# Patient Record
Sex: FEMALE | Race: WHITE | Employment: FULL TIME | URBAN - METROPOLITAN AREA
[De-identification: names, ages, dates, MRNs, and addresses within clinical notes are randomized per-mention and may not be internally consistent; named-entity substitution may affect disease eponyms.]

---

## 2017-01-05 RX ORDER — ATORVASTATIN CALCIUM 20 MG/1
TABLET, FILM COATED ORAL
Qty: 180 TABLET | Refills: 0 | Status: SHIPPED | OUTPATIENT
Start: 2017-01-05 | End: 2017-01-20

## 2017-01-20 RX ORDER — ATORVASTATIN CALCIUM 20 MG/1
TABLET, FILM COATED ORAL
Qty: 180 TABLET | Refills: 0 | Status: SHIPPED | OUTPATIENT
Start: 2017-01-20 | End: 2017-10-11 | Stop reason: DRUGHIGH

## 2017-02-21 RX ORDER — CANAGLIFLOZIN 300 MG/1
TABLET, FILM COATED ORAL
Qty: 30 TABLET | Refills: 5 | Status: SHIPPED | OUTPATIENT
Start: 2017-02-21 | End: 2017-08-14

## 2017-02-28 ENCOUNTER — PATIENT OUTREACH (OUTPATIENT)
Dept: FAMILY MEDICINE CLINIC | Facility: CLINIC | Age: 52
End: 2017-02-28

## 2017-03-21 ENCOUNTER — PATIENT OUTREACH (OUTPATIENT)
Dept: FAMILY MEDICINE CLINIC | Facility: CLINIC | Age: 52
End: 2017-03-21

## 2017-03-21 NOTE — PROGRESS NOTES
LM to call back  # [phone]. CM name and number provided for further f/u. Calling to assist in coordination of health care needs. P= Await call return.

## 2017-04-12 ENCOUNTER — OFFICE VISIT (OUTPATIENT)
Dept: ENDOCRINOLOGY CLINIC | Facility: CLINIC | Age: 52
End: 2017-04-12

## 2017-04-12 VITALS
SYSTOLIC BLOOD PRESSURE: 112 MMHG | HEIGHT: 62 IN | BODY MASS INDEX: 28.86 KG/M2 | WEIGHT: 156.81 LBS | DIASTOLIC BLOOD PRESSURE: 78 MMHG | HEART RATE: 96 BPM

## 2017-04-12 DIAGNOSIS — Z79.4 UNCONTROLLED TYPE 2 DIABETES MELLITUS WITH HYPERGLYCEMIA, WITH LONG-TERM CURRENT USE OF INSULIN (HCC): Primary | ICD-10-CM

## 2017-04-12 DIAGNOSIS — E11.65 UNCONTROLLED TYPE 2 DIABETES MELLITUS WITH HYPERGLYCEMIA, WITH LONG-TERM CURRENT USE OF INSULIN (HCC): Primary | ICD-10-CM

## 2017-04-12 PROCEDURE — 99213 OFFICE O/P EST LOW 20 MIN: CPT | Performed by: INTERNAL MEDICINE

## 2017-04-12 PROCEDURE — 36416 COLLJ CAPILLARY BLOOD SPEC: CPT | Performed by: INTERNAL MEDICINE

## 2017-04-12 PROCEDURE — 82962 GLUCOSE BLOOD TEST: CPT | Performed by: INTERNAL MEDICINE

## 2017-04-12 PROCEDURE — 83036 HEMOGLOBIN GLYCOSYLATED A1C: CPT | Performed by: INTERNAL MEDICINE

## 2017-04-12 NOTE — PROGRESS NOTES
Name: Ubaldo Coley  Date: 4/12/2017    Referring Physician: No ref. provider found    HISTORY OF PRESENT ILLNESS   Ubaldo Coley is a 46year old female who presents for diabetes mellitus.   Since last visit her schedule has changed to nights and getting h EVERY MORNING BEFORE BREAKFAST, Disp: 90 tablet, Rfl: 3  •  MetFORMIN HCl 500 MG Oral Tab, TAKE 2 TABLETS BY MOUTH TWICE DAILY WITH MORNING AND EVENING MEALS, Disp: 270 tablet, Rfl: 3  •  MICROLET LANCETS Does not apply Misc, TEST AS DIRECTED TWICE DAILY, grossly intact  Psychiatric:  oriented to time, self, and place  Nutritional:  no abnormal weight gain or loss    ASSESSMENT/PLAN:      1.  Diabetes Mellitus Type 2, Uncontrolled  -uncontrolled, HgA1c 6.7% -->improved   -She has been relatively stable on ab

## 2017-06-06 RX ORDER — GLIMEPIRIDE 4 MG/1
TABLET ORAL
Qty: 90 TABLET | Refills: 1 | Status: SHIPPED | OUTPATIENT
Start: 2017-06-06 | End: 2017-10-11 | Stop reason: DRUGHIGH

## 2017-07-13 RX ORDER — DULAGLUTIDE 1.5 MG/.5ML
INJECTION, SOLUTION SUBCUTANEOUS
Qty: 2 ML | Refills: 2 | Status: SHIPPED | OUTPATIENT
Start: 2017-07-13 | End: 2017-10-11

## 2017-08-14 RX ORDER — CANAGLIFLOZIN 300 MG/1
TABLET, FILM COATED ORAL
Qty: 30 TABLET | Refills: 0 | Status: SHIPPED | OUTPATIENT
Start: 2017-08-14 | End: 2017-09-22

## 2017-09-18 ENCOUNTER — HOSPITAL ENCOUNTER (OUTPATIENT)
Dept: MAMMOGRAPHY | Age: 52
Discharge: HOME | End: 2017-09-18
Attending: FAMILY MEDICINE
Payer: COMMERCIAL

## 2017-09-18 DIAGNOSIS — E11.9 TYPE 2 DIABETES MELLITUS WITHOUT COMPLICATION, UNSPECIFIED LONG TERM INSULIN USE STATUS: ICD-10-CM

## 2017-09-18 PROCEDURE — 77067 SCR MAMMO BI INCL CAD: CPT | Performed by: FAMILY MEDICINE

## 2017-09-22 RX ORDER — CANAGLIFLOZIN 300 MG/1
TABLET, FILM COATED ORAL
Qty: 30 TABLET | Refills: 0 | Status: SHIPPED | OUTPATIENT
Start: 2017-09-22 | End: 2017-10-11

## 2017-10-02 RX ORDER — ATORVASTATIN CALCIUM 20 MG/1
40 TABLET, FILM COATED ORAL NIGHTLY
Qty: 180 TABLET | Refills: 0 | Status: SHIPPED | OUTPATIENT
Start: 2017-10-02 | End: 2017-10-11

## 2017-10-03 ENCOUNTER — APPOINTMENT (OUTPATIENT)
Dept: LAB | Age: 52
End: 2017-10-03
Attending: INTERNAL MEDICINE
Payer: COMMERCIAL

## 2017-10-03 DIAGNOSIS — Z79.4 UNCONTROLLED TYPE 2 DIABETES MELLITUS WITH HYPERGLYCEMIA, WITH LONG-TERM CURRENT USE OF INSULIN (HCC): ICD-10-CM

## 2017-10-03 DIAGNOSIS — E11.65 UNCONTROLLED TYPE 2 DIABETES MELLITUS WITH HYPERGLYCEMIA, WITH LONG-TERM CURRENT USE OF INSULIN (HCC): ICD-10-CM

## 2017-10-03 PROCEDURE — 84443 ASSAY THYROID STIM HORMONE: CPT

## 2017-10-03 PROCEDURE — 82570 ASSAY OF URINE CREATININE: CPT

## 2017-10-03 PROCEDURE — 80061 LIPID PANEL: CPT

## 2017-10-03 PROCEDURE — 82043 UR ALBUMIN QUANTITATIVE: CPT

## 2017-10-03 PROCEDURE — 36415 COLL VENOUS BLD VENIPUNCTURE: CPT

## 2017-10-03 PROCEDURE — 80053 COMPREHEN METABOLIC PANEL: CPT

## 2017-10-11 ENCOUNTER — OFFICE VISIT (OUTPATIENT)
Dept: ENDOCRINOLOGY CLINIC | Facility: CLINIC | Age: 52
End: 2017-10-11

## 2017-10-11 VITALS
DIASTOLIC BLOOD PRESSURE: 79 MMHG | BODY MASS INDEX: 29.45 KG/M2 | HEIGHT: 61 IN | SYSTOLIC BLOOD PRESSURE: 120 MMHG | HEART RATE: 102 BPM | WEIGHT: 156 LBS

## 2017-10-11 DIAGNOSIS — E11.65 CONTROLLED TYPE 2 DIABETES MELLITUS WITH HYPERGLYCEMIA, WITHOUT LONG-TERM CURRENT USE OF INSULIN (HCC): Primary | ICD-10-CM

## 2017-10-11 PROCEDURE — 82962 GLUCOSE BLOOD TEST: CPT | Performed by: INTERNAL MEDICINE

## 2017-10-11 PROCEDURE — 99213 OFFICE O/P EST LOW 20 MIN: CPT | Performed by: INTERNAL MEDICINE

## 2017-10-11 PROCEDURE — 36416 COLLJ CAPILLARY BLOOD SPEC: CPT | Performed by: INTERNAL MEDICINE

## 2017-10-11 PROCEDURE — 83036 HEMOGLOBIN GLYCOSYLATED A1C: CPT | Performed by: INTERNAL MEDICINE

## 2017-10-11 RX ORDER — GLIMEPIRIDE 4 MG/1
TABLET ORAL
Qty: 90 TABLET | Refills: 3 | Status: SHIPPED | OUTPATIENT
Start: 2017-10-11 | End: 2018-10-15

## 2017-10-11 RX ORDER — ATORVASTATIN CALCIUM 20 MG/1
40 TABLET, FILM COATED ORAL NIGHTLY
Qty: 180 TABLET | Refills: 1 | Status: SHIPPED | OUTPATIENT
Start: 2017-10-11 | End: 2018-07-13

## 2017-10-11 NOTE — PROGRESS NOTES
Name: Sita Duran  Date: 10/11/2017    Referring Physician: No ref. provider found    HISTORY OF PRESENT ILLNESS   Sita Duran is a 46year old female who presents for diabetes mellitus.   Since last visit her schedule has changed to nights and getting CONTOUR NEXT TEST In Vitro Strip, TEST TWICE DAILY, Disp: 100 strip, Rfl: 2  •  ATORVASTATIN CALCIUM 20 MG Oral Tab, TAKE 2 TABLETS BY MOUTH NIGHTLY, Disp: 180 tablet, Rfl: 0  •  glimepiride 4 MG Oral Tab, TAKE 1 TABLET BY MOUTH EVERY MORNING BEFORE BREAKF S4  Gastrointestinal:  normal bowel sounds and no palpable masses in abdomen, organomegaly or tenderness   Musculoskeletal:  normal muscle strength and tone  Skin:  normal moisture and skin texture  Hair & Nails:  normal scalp hair     Neuro:  sensory raz

## 2017-10-17 ENCOUNTER — TELEPHONE (OUTPATIENT)
Dept: INTERNAL MEDICINE CLINIC | Facility: CLINIC | Age: 52
End: 2017-10-17

## 2017-10-17 NOTE — TELEPHONE ENCOUNTER
[facility] ext [phone]    Colonoscopy referral approved by PCP. Please offer appointment for GI consult/colonoscopy  transfer call to GI scheduling at ext [phone] for appointment.

## 2017-10-18 RX ORDER — DULAGLUTIDE 1.5 MG/.5ML
INJECTION, SOLUTION SUBCUTANEOUS
Qty: 2 ML | Refills: 5 | Status: SHIPPED | OUTPATIENT
Start: 2017-10-18 | End: 2018-08-24

## 2017-10-20 RX ORDER — CANAGLIFLOZIN 300 MG/1
TABLET, FILM COATED ORAL
Qty: 30 TABLET | Refills: 5 | Status: SHIPPED | OUTPATIENT
Start: 2017-10-20 | End: 2018-04-16

## 2018-03-22 NOTE — TELEPHONE ENCOUNTER
Current Outpatient Prescriptions:  MetFORMIN HCl 500 MG Oral Tab TAKE 2 TABLETS BY MOUTH TWICE DAILY( WITH MORNING AND EVENING MEALS) Disp: 270 tablet Rfl: 1

## 2018-04-11 ENCOUNTER — OFFICE VISIT (OUTPATIENT)
Dept: ENDOCRINOLOGY CLINIC | Facility: CLINIC | Age: 53
End: 2018-04-11

## 2018-04-11 VITALS
HEIGHT: 61 IN | HEART RATE: 105 BPM | DIASTOLIC BLOOD PRESSURE: 83 MMHG | BODY MASS INDEX: 28.89 KG/M2 | WEIGHT: 153 LBS | SYSTOLIC BLOOD PRESSURE: 126 MMHG

## 2018-04-11 DIAGNOSIS — E11.65 UNCONTROLLED TYPE 2 DIABETES MELLITUS WITH COMPLICATION, WITHOUT LONG-TERM CURRENT USE OF INSULIN (HCC): Primary | ICD-10-CM

## 2018-04-11 DIAGNOSIS — E11.8 UNCONTROLLED TYPE 2 DIABETES MELLITUS WITH COMPLICATION, WITHOUT LONG-TERM CURRENT USE OF INSULIN (HCC): Primary | ICD-10-CM

## 2018-04-11 PROCEDURE — 82962 GLUCOSE BLOOD TEST: CPT | Performed by: INTERNAL MEDICINE

## 2018-04-11 PROCEDURE — 99212 OFFICE O/P EST SF 10 MIN: CPT | Performed by: INTERNAL MEDICINE

## 2018-04-11 PROCEDURE — 36416 COLLJ CAPILLARY BLOOD SPEC: CPT | Performed by: INTERNAL MEDICINE

## 2018-04-11 PROCEDURE — 99213 OFFICE O/P EST LOW 20 MIN: CPT | Performed by: INTERNAL MEDICINE

## 2018-04-11 PROCEDURE — 83036 HEMOGLOBIN GLYCOSYLATED A1C: CPT | Performed by: INTERNAL MEDICINE

## 2018-04-11 RX ORDER — LANCETS
EACH MISCELLANEOUS
Qty: 100 EACH | Refills: 11 | Status: SHIPPED | OUTPATIENT
Start: 2018-04-11 | End: 2019-04-15

## 2018-04-11 NOTE — PROGRESS NOTES
Name: Kana Marie  Date: 4/11/2018    Referring Physician: No ref. provider found    HISTORY OF PRESENT ILLNESS   Kana Marie is a 48year old female who presents for diabetes mellitus.   Since last visit her schedule has changed to nights and getting h total) by mouth nightly., Disp: 180 tablet, Rfl: 1  •  Canagliflozin (INVOKANA) 300 MG Oral Tab, Take 1 tablet by mouth once daily. , Disp: 90 tablet, Rfl: 1  •  Dulaglutide (TRULICITY) 1.5 VF/5.4IY Subcutaneous Solution Pen-injector, Inject 1.5 mg into the murmurs, S3 or S4  Gastrointestinal:  normal bowel sounds and no palpable masses in abdomen, organomegaly or tenderness   Musculoskeletal:  normal muscle strength and tone  Skin:  normal moisture and skin texture  Hair & Nails:  normal scalp hair     Neuro

## 2018-04-16 NOTE — TELEPHONE ENCOUNTER
Current Outpatient Prescriptions:  INVOKANA 300 MG Oral Tab TAKE 1 TABLET BY MOUTH DAILY Disp: 30 tablet Rfl: 5     Refill

## 2018-07-13 RX ORDER — ATORVASTATIN CALCIUM 20 MG/1
40 TABLET, FILM COATED ORAL NIGHTLY
Qty: 180 TABLET | Refills: 1 | Status: SHIPPED | OUTPATIENT
Start: 2018-07-13 | End: 2019-10-13

## 2018-07-18 ENCOUNTER — OFFICE VISIT (OUTPATIENT)
Dept: ENDOCRINOLOGY CLINIC | Facility: CLINIC | Age: 53
End: 2018-07-18

## 2018-07-18 VITALS
SYSTOLIC BLOOD PRESSURE: 107 MMHG | HEIGHT: 61 IN | WEIGHT: 154.19 LBS | HEART RATE: 89 BPM | DIASTOLIC BLOOD PRESSURE: 71 MMHG | BODY MASS INDEX: 29.11 KG/M2

## 2018-07-18 DIAGNOSIS — E11.9 TYPE 2 DIABETES MELLITUS WITHOUT COMPLICATION, WITH LONG-TERM CURRENT USE OF INSULIN (HCC): Primary | ICD-10-CM

## 2018-07-18 DIAGNOSIS — Z79.4 TYPE 2 DIABETES MELLITUS WITHOUT COMPLICATION, WITH LONG-TERM CURRENT USE OF INSULIN (HCC): Primary | ICD-10-CM

## 2018-07-18 LAB
CARTRIDGE LOT#: ABNORMAL NUMERIC
GLUCOSE BLOOD: 171
HEMOGLOBIN A1C: 7.5 % (ref 4.3–5.6)
TEST STRIP LOT #: NORMAL NUMERIC

## 2018-07-18 PROCEDURE — 99213 OFFICE O/P EST LOW 20 MIN: CPT | Performed by: INTERNAL MEDICINE

## 2018-07-18 PROCEDURE — 83036 HEMOGLOBIN GLYCOSYLATED A1C: CPT | Performed by: INTERNAL MEDICINE

## 2018-07-18 PROCEDURE — 82962 GLUCOSE BLOOD TEST: CPT | Performed by: INTERNAL MEDICINE

## 2018-07-18 PROCEDURE — 36416 COLLJ CAPILLARY BLOOD SPEC: CPT | Performed by: INTERNAL MEDICINE

## 2018-07-18 PROCEDURE — 99212 OFFICE O/P EST SF 10 MIN: CPT | Performed by: INTERNAL MEDICINE

## 2018-07-18 NOTE — PROGRESS NOTES
Name: Belinda Cueva  Date: 7/18/2018    Referring Physician: No ref. provider found    HISTORY OF PRESENT ILLNESS   Belinda Cueva is a 48year old female who presents for diabetes mellitus.       Since last visit she has been on vacation in [place] for 11 day 5  •  glimepiride 4 MG Oral Tab, TAKE 1 TABLET BY MOUTH EVERY MORNING BEFORE BREAKFAST, Disp: 90 tablet, Rfl: 3  •  Canagliflozin (INVOKANA) 300 MG Oral Tab, Take 1 tablet by mouth once daily. , Disp: 90 tablet, Rfl: 1  •  Dulaglutide (TRULICITY) 1.5 FN/9.8 bilaterally  Cardiovascular:  regular rate, rhythm, , no murmurs, S3 or S4  Gastrointestinal:  normal bowel sounds and no palpable masses in abdomen, organomegaly or tenderness   Musculoskeletal:  normal muscle strength and tone  Skin:  normal moisture and

## 2018-08-13 ENCOUNTER — TELEPHONE (OUTPATIENT)
Dept: OPTOMETRY | Facility: CLINIC | Age: 53
End: 2018-08-13

## 2018-08-13 NOTE — TELEPHONE ENCOUNTER
Pt has an appt tomorrow 8/14 with Dr Sonya Salazar for eye exam. Pt needs a referral to be seen.  Thank you

## 2018-08-14 ENCOUNTER — OFFICE VISIT (OUTPATIENT)
Dept: OPTOMETRY | Facility: CLINIC | Age: 53
End: 2018-08-14

## 2018-08-14 DIAGNOSIS — E11.9 TYPE 2 DIABETES MELLITUS WITHOUT COMPLICATION, WITHOUT LONG-TERM CURRENT USE OF INSULIN (HCC): Primary | ICD-10-CM

## 2018-08-14 DIAGNOSIS — H25.13 AGE-RELATED NUCLEAR CATARACT OF BOTH EYES: ICD-10-CM

## 2018-08-14 PROCEDURE — 92014 COMPRE OPH EXAM EST PT 1/>: CPT | Performed by: OPTOMETRIST

## 2018-08-14 NOTE — PATIENT INSTRUCTIONS
Age related cataract  No treatment is required. Will continue to observe.     Type 2 diabetes mellitus without complication, without long-term current use of insulin ([facility] [address]  I advised patient that there is no background diabetic retinopathy in either eye and

## 2018-08-14 NOTE — ASSESSMENT & PLAN NOTE
I advised patient that there is no background diabetic retinopathy in either eye and that they should continue to keep their blood sugar under control and continue to see their physician as directed. I stressed the importance of yearly diabetic eye exams.
No treatment is required. Will continue to observe.
purse/shoes/cell phone/plastic bag/clothing

## 2018-08-14 NOTE — PROGRESS NOTES
Segundo Gipson is a 48year old female. HPI:     HPI     Diabetic Eye Exam   Diabetes characteristics include Type 2, controlled with diet and taking oral medications. Duration of 6 years. Number of years diabetic 6. Number of years on pills 6.   Numbe INJECT 1.5 MG UNDER THE SKIN ONCE A WEEK Disp: 2 mL Rfl: 5   glimepiride 4 MG Oral Tab TAKE 1 TABLET BY MOUTH EVERY MORNING BEFORE BREAKFAST Disp: 90 tablet Rfl: 3   Dulaglutide (TRULICITY) 1.5 MA/9.8EU Subcutaneous Solution Pen-injector Inject 1.5 mg into Nuclear sclerosis    Vitreous Clear Clear          Fundus Exam       Right Left    Disc Normal Normal    C/D Ratio 0.2 0.2    Macula Normal No BDR Normal No BDR    Vessels Normal Normal    Periphery Normal Normal            Refraction     Wearing Rx

## 2018-08-15 ENCOUNTER — APPOINTMENT (OUTPATIENT)
Dept: LAB | Age: 53
End: 2018-08-15
Attending: INTERNAL MEDICINE
Payer: COMMERCIAL

## 2018-08-15 DIAGNOSIS — E11.9 TYPE 2 DIABETES MELLITUS WITHOUT COMPLICATION, WITH LONG-TERM CURRENT USE OF INSULIN (HCC): ICD-10-CM

## 2018-08-15 DIAGNOSIS — Z79.4 TYPE 2 DIABETES MELLITUS WITHOUT COMPLICATION, WITH LONG-TERM CURRENT USE OF INSULIN (HCC): ICD-10-CM

## 2018-08-15 LAB
ALBUMIN SERPL BCP-MCNC: 3.9 G/DL (ref 3.5–4.8)
ALBUMIN/GLOB SERPL: 1.4 {RATIO} (ref 1–2)
ALP SERPL-CCNC: 49 U/L (ref 32–100)
ALT SERPL-CCNC: 22 U/L (ref 14–54)
ANION GAP SERPL CALC-SCNC: 9 MMOL/L (ref 0–18)
AST SERPL-CCNC: 18 U/L (ref 15–41)
BILIRUB SERPL-MCNC: 0.6 MG/DL (ref 0.3–1.2)
BUN SERPL-MCNC: 20 MG/DL (ref 8–20)
BUN/CREAT SERPL: 29.9 (ref 10–20)
CALCIUM SERPL-MCNC: 8.9 MG/DL (ref 8.5–10.5)
CHLORIDE SERPL-SCNC: 104 MMOL/L (ref 95–110)
CHOLEST SERPL-MCNC: 143 MG/DL (ref 110–200)
CO2 SERPL-SCNC: 25 MMOL/L (ref 22–32)
CREAT SERPL-MCNC: 0.67 MG/DL (ref 0.5–1.5)
CREAT UR-MCNC: 74.6 MG/DL
GLOBULIN PLAS-MCNC: 2.7 G/DL (ref 2.5–3.7)
GLUCOSE SERPL-MCNC: 93 MG/DL (ref 70–99)
HDLC SERPL-MCNC: 38 MG/DL
LDLC SERPL CALC-MCNC: 84 MG/DL (ref 0–99)
MICROALBUMIN UR-MCNC: 0.4 MG/DL (ref 0–1.8)
MICROALBUMIN/CREAT UR: 5.4 MG/G{CREAT} (ref 0–20)
NONHDLC SERPL-MCNC: 105 MG/DL
OSMOLALITY UR CALC.SUM OF ELEC: 288 MOSM/KG (ref 275–295)
PATIENT FASTING: YES
POTASSIUM SERPL-SCNC: 3.9 MMOL/L (ref 3.3–5.1)
PROT SERPL-MCNC: 6.6 G/DL (ref 5.9–8.4)
SODIUM SERPL-SCNC: 138 MMOL/L (ref 136–144)
TRIGL SERPL-MCNC: 107 MG/DL (ref 1–149)
TSH SERPL-ACNC: 2.7 UIU/ML (ref 0.45–5.33)

## 2018-08-15 PROCEDURE — 82043 UR ALBUMIN QUANTITATIVE: CPT

## 2018-08-15 PROCEDURE — 84443 ASSAY THYROID STIM HORMONE: CPT

## 2018-08-15 PROCEDURE — 80053 COMPREHEN METABOLIC PANEL: CPT

## 2018-08-15 PROCEDURE — 82570 ASSAY OF URINE CREATININE: CPT

## 2018-08-15 PROCEDURE — 36415 COLL VENOUS BLD VENIPUNCTURE: CPT

## 2018-08-15 PROCEDURE — 80061 LIPID PANEL: CPT

## 2018-08-24 ENCOUNTER — OFFICE VISIT (OUTPATIENT)
Dept: FAMILY MEDICINE CLINIC | Facility: CLINIC | Age: 53
End: 2018-08-24

## 2018-08-24 ENCOUNTER — OFFICE VISIT (OUTPATIENT)
Dept: PODIATRY CLINIC | Facility: CLINIC | Age: 53
End: 2018-08-24

## 2018-08-24 VITALS
RESPIRATION RATE: 16 BRPM | HEIGHT: 61 IN | TEMPERATURE: 97 F | SYSTOLIC BLOOD PRESSURE: 112 MMHG | DIASTOLIC BLOOD PRESSURE: 77 MMHG | BODY MASS INDEX: 28.7 KG/M2 | HEART RATE: 99 BPM | WEIGHT: 152 LBS

## 2018-08-24 DIAGNOSIS — L60.0 ONYCHOCRYPTOSIS: ICD-10-CM

## 2018-08-24 DIAGNOSIS — E11.9 TYPE 2 DIABETES MELLITUS WITHOUT COMPLICATION, WITHOUT LONG-TERM CURRENT USE OF INSULIN (HCC): Primary | ICD-10-CM

## 2018-08-24 DIAGNOSIS — L60.0 INGROWN NAIL: Primary | ICD-10-CM

## 2018-08-24 DIAGNOSIS — M79.675 PAIN OF TOE OF LEFT FOOT: ICD-10-CM

## 2018-08-24 PROCEDURE — 99203 OFFICE O/P NEW LOW 30 MIN: CPT | Performed by: PODIATRIST

## 2018-08-24 PROCEDURE — 99212 OFFICE O/P EST SF 10 MIN: CPT | Performed by: FAMILY MEDICINE

## 2018-08-24 NOTE — PROGRESS NOTES
Blood pressure 112/77, pulse 99, temperature 97.3 °F (36.3 °C), resp. rate 16, height 5' 1\" (1.549 m), weight 152 lb (68.9 kg). Patient presents today complaining of an ingrown nail. She reports is very painful she cannot touch it.   She has a history

## 2018-08-26 NOTE — PROGRESS NOTES
Benji Gardner is a 48year old female. Patient presents with:  Consult: left great toe pain -- Pt is diabetic. Onset about 2 weeks. Denies drainage. Rates pain 8/10 with touch. No Abx taken.          HPI:   We were called from the physician's office to get Spouse name:                       Years of education:                 Number of children:               Social History Main Topics    Smoking status: Current Every Day Smoker                                                     Packs/day: 0.00      Years was removed using a curette. Patient will apply antibiotic ointment and a Band-Aid for the next few days follow-up in 2 weeks and antibiotic ointment and a Band-Aid was applied today.     The patient indicates understanding of these issues and agrees to th

## 2018-09-07 ENCOUNTER — OFFICE VISIT (OUTPATIENT)
Dept: PODIATRY CLINIC | Facility: CLINIC | Age: 53
End: 2018-09-07

## 2018-09-07 PROCEDURE — 99213 OFFICE O/P EST LOW 20 MIN: CPT | Performed by: PODIATRIST

## 2018-09-11 ENCOUNTER — PATIENT OUTREACH (OUTPATIENT)
Dept: CASE MANAGEMENT | Age: 53
End: 2018-09-11

## 2018-09-11 NOTE — PROGRESS NOTES
Kemar Tompkins is a 48year old female. Patient presents with:  Diabetic Foot Care: Pt is here for a follow up on left great toe. Pt states a piece of nail was removed last visit. Denies any pain. Denies adny drainage. Pt's blood sugar this am 104.    Priyanka Torres Not on file    Social Needs      Financial resource strain: Not on file      Food insecurity - worry: Not on file      Food insecurity - inability: Not on file      Transportation needs - medical: Not on file      Transportation needs - non-medical: Not on hyperkeratotic impactions with a curette and advised patient for follow-up if it bothers her. The patient indicates understanding of these issues and agrees to the plan. Return if symptoms worsen or fail to improve.     Aylin Welch, HUI  9/10/20

## 2018-09-25 RX ORDER — BLOOD SUGAR DIAGNOSTIC
STRIP MISCELLANEOUS
Qty: 200 STRIP | Refills: 1 | Status: SHIPPED | OUTPATIENT
Start: 2018-09-25 | End: 2019-03-27

## 2018-10-15 RX ORDER — GLIMEPIRIDE 4 MG/1
TABLET ORAL
Qty: 90 TABLET | Refills: 0 | Status: SHIPPED | OUTPATIENT
Start: 2018-10-15 | End: 2019-01-31

## 2018-10-15 NOTE — TELEPHONE ENCOUNTER
Diabetes Medications  Protocol Criteria:  · Appointment scheduled in the past 6 months or the next 3 months  · A1C < 7.5 in the past 6 months  · Creatinine in the past 12 months  · Creatinine result < 1.5   Recent Outpatient Visits            1 month ago

## 2018-10-15 NOTE — TELEPHONE ENCOUNTER
glimepiride 4 MG Oral Tab TAKE 1 TABLET BY MOUTH EVERY MORNING BEFORE BREAKFAST Disp: 90 tablet Rfl: 3     [facility] faxed refill request for medication . Please advise

## 2018-10-22 RX ORDER — DULAGLUTIDE 1.5 MG/.5ML
INJECTION, SOLUTION SUBCUTANEOUS
Qty: 2 ML | Refills: 2 | Status: SHIPPED | OUTPATIENT
Start: 2018-10-22 | End: 2019-01-28

## 2018-11-03 ENCOUNTER — OFFICE VISIT (OUTPATIENT)
Dept: PODIATRY CLINIC | Facility: CLINIC | Age: 53
End: 2018-11-03

## 2018-11-03 VITALS — SYSTOLIC BLOOD PRESSURE: 129 MMHG | HEART RATE: 102 BPM | DIASTOLIC BLOOD PRESSURE: 75 MMHG

## 2018-11-03 DIAGNOSIS — L60.0 ONYCHOCRYPTOSIS: ICD-10-CM

## 2018-11-03 DIAGNOSIS — E11.9 TYPE 2 DIABETES MELLITUS WITHOUT COMPLICATION, WITHOUT LONG-TERM CURRENT USE OF INSULIN (HCC): Primary | ICD-10-CM

## 2018-11-03 DIAGNOSIS — M79.675 PAIN OF TOE OF LEFT FOOT: ICD-10-CM

## 2018-11-03 PROCEDURE — 11750 EXCISION NAIL&NAIL MATRIX: CPT | Performed by: PODIATRIST

## 2018-11-03 PROCEDURE — 99213 OFFICE O/P EST LOW 20 MIN: CPT | Performed by: PODIATRIST

## 2018-11-03 RX ORDER — AMOXICILLIN AND CLAVULANATE POTASSIUM 875; 125 MG/1; MG/1
1 TABLET, FILM COATED ORAL 2 TIMES DAILY
Qty: 14 TABLET | Refills: 0 | Status: SHIPPED | OUTPATIENT
Start: 2018-11-03 | End: 2018-12-20 | Stop reason: ALTCHOICE

## 2018-11-03 NOTE — PROGRESS NOTES
Jenaro Shultz is a 48year old female. Patient presents with:  Procedure: left great toe nail procedure BG today 117        HPI:   Patient returns to the clinic to have permanent correction of her ingrown nail edge on the left great toe.   Pain is beginning • Diabetes Sister    • Diabetes Brother    • Colon Cancer Other         per NG:  Close relative    • Diabetes Brother    • Glaucoma Neg       Social History    Socioeconomic History      Marital status: Single      Spouse name: Not on file      Number of in no apparent distress  EXTREMITIES:   Examination today shows fairly severe incurvation at the lateral nail border of her left hallux there is mild erythema but there is no drainage no sign of infection.   There is pain on palpation and there is hyperkera of alcohol. The tourniquet was released and the digit return to its uniform pink color and was warm to the touch. Sterile dressing was applied using Neosporin ointment gauze Susan and a Coban wrap.   Patient was given proper soaking instructions to begin

## 2018-11-14 ENCOUNTER — OFFICE VISIT (OUTPATIENT)
Dept: PODIATRY CLINIC | Facility: CLINIC | Age: 53
End: 2018-11-14

## 2018-11-14 DIAGNOSIS — M79.675 PAIN OF TOE OF LEFT FOOT: ICD-10-CM

## 2018-11-14 DIAGNOSIS — E11.9 TYPE 2 DIABETES MELLITUS WITHOUT COMPLICATION, WITHOUT LONG-TERM CURRENT USE OF INSULIN (HCC): Primary | ICD-10-CM

## 2018-11-14 DIAGNOSIS — L60.0 ONYCHOCRYPTOSIS: ICD-10-CM

## 2018-11-14 PROCEDURE — 99024 POSTOP FOLLOW-UP VISIT: CPT | Performed by: PODIATRIST

## 2018-11-15 NOTE — PROGRESS NOTES
Jessica Chew is a 48year old female. Patient presents with: Follow - Up: Pt is here for a follwo up on ingrown toe nail procedure. Denies any pain Denies any drainage . Lyndon Michelle Am blood sugar 117. Pt sees Dr. Celso Ochoa  an Dr. Yris Monahan for diabetes.  Last A1C 7.5 [id number]      Family History   Problem Relation Age of Onset   • Diabetes Father    • Diabetes Mother    • Diabetes Sister    • Diabetes Brother    • Colon Cancer Other         per NG:  Close relative    • Diabetes Brother    • Glaucoma Neg       Soc visit.  Physical Exam  GENERAL: well developed, well nourished, in no apparent distress  EXTREMITIES:   Lateral nail border is healing uneventfully patient states it feels much better only mild drainage.   ASSESSMENT AND PLAN:   Diagnoses and all orders for

## 2018-12-20 ENCOUNTER — HOSPITAL ENCOUNTER (OUTPATIENT)
Dept: GENERAL RADIOLOGY | Age: 53
Discharge: HOME | End: 2018-12-20
Attending: FAMILY MEDICINE
Payer: COMMERCIAL

## 2018-12-20 ENCOUNTER — OFFICE VISIT (OUTPATIENT)
Dept: FAMILY MEDICINE CLINIC | Facility: CLINIC | Age: 53
End: 2018-12-20

## 2018-12-20 VITALS
WEIGHT: 151.5 LBS | SYSTOLIC BLOOD PRESSURE: 107 MMHG | HEIGHT: 61 IN | DIASTOLIC BLOOD PRESSURE: 73 MMHG | BODY MASS INDEX: 28.6 KG/M2 | HEART RATE: 91 BPM

## 2018-12-20 DIAGNOSIS — R91.1 LESION OF LUNG: ICD-10-CM

## 2018-12-20 DIAGNOSIS — M25.511 CHRONIC RIGHT SHOULDER PAIN: ICD-10-CM

## 2018-12-20 DIAGNOSIS — E66.9 DIABETES MELLITUS TYPE 2 IN OBESE (HCC): ICD-10-CM

## 2018-12-20 DIAGNOSIS — Z00.00 ROUTINE PHYSICAL EXAMINATION: Primary | ICD-10-CM

## 2018-12-20 DIAGNOSIS — E11.69 DIABETES MELLITUS TYPE 2 IN OBESE (HCC): ICD-10-CM

## 2018-12-20 DIAGNOSIS — G89.29 CHRONIC RIGHT SHOULDER PAIN: ICD-10-CM

## 2018-12-20 DIAGNOSIS — E78.5 HYPERLIPIDEMIA, UNSPECIFIED HYPERLIPIDEMIA TYPE: ICD-10-CM

## 2018-12-20 DIAGNOSIS — Z12.31 SCREENING MAMMOGRAM, ENCOUNTER FOR: ICD-10-CM

## 2018-12-20 PROCEDURE — 90670 PCV13 VACCINE IM: CPT | Performed by: FAMILY MEDICINE

## 2018-12-20 PROCEDURE — 90471 IMMUNIZATION ADMIN: CPT | Performed by: FAMILY MEDICINE

## 2018-12-20 PROCEDURE — 90686 IIV4 VACC NO PRSV 0.5 ML IM: CPT | Performed by: FAMILY MEDICINE

## 2018-12-20 PROCEDURE — 90472 IMMUNIZATION ADMIN EACH ADD: CPT | Performed by: FAMILY MEDICINE

## 2018-12-20 PROCEDURE — 99396 PREV VISIT EST AGE 40-64: CPT | Performed by: FAMILY MEDICINE

## 2018-12-20 PROCEDURE — 73030 X-RAY EXAM OF SHOULDER: CPT | Performed by: FAMILY MEDICINE

## 2018-12-20 RX ORDER — IBUPROFEN 600 MG/1
600 TABLET ORAL EVERY 8 HOURS PRN
Qty: 90 TABLET | Refills: 1 | Status: SHIPPED | OUTPATIENT
Start: 2018-12-20 | End: 2018-12-30

## 2018-12-20 NOTE — PROGRESS NOTES
Blood pressure 107/73, pulse 91, height 5' 1\" (1.549 m), weight 151 lb 8 oz (68.7 kg). REASON FOR VISIT:    Hilda Ko is a 48year old female who presents for an [address]         Patient Active Problem List:     Type 2 diabetes rome Hepatitis C Screening Screen pts at high risk plus screen one time for adults born [address] [place] [address] (S) (no units)   Date Value   06/05/2013 Non-Reactive      Tuberculosis Screen If high risk No components found for: [facility] Rfl: 3      MEDICAL INFORMATION:   Past Medical History:   Diagnosis Date   • Type II or unspecified type diabetes mellitus without mention of complication, not stated as uncontrolled       Past Surgical History:   Procedure Laterality Date   •  bruits  CHEST: no chest tenderness  BREAST: deferred   LUNGS: clear to auscultation  CARDIO: RRR without murmur  GI: good BS's, no masses, HSM or tenderness  :deferred  RECTAL: deferred  MUSCULOSKELETAL: back is not tender, FROM of the back LEFT SHOULDER preventive care reminders to display for this patient. Tdap: There are no preventive care reminders to display for this patient.   Shingles: Shingrix shingles vaccine is due    Influenza Annually   Pneumococcal if high risk   Td/Tdap once then every 10 yea

## 2018-12-20 NOTE — PATIENT INSTRUCTIONS
La colonoscopia     Se [place] justino cámara acoplada a un tubo flexible con justino lente que kathleen imágenes de video. La colonoscopia es un examen que permite mirar el interior del aparato digestivo inferior (el colon y el recto).  Algunas veces de [employer] · Romero esta prueba podrían hacerse biopsias (muestras de tejido), remoción de pólipos y otros tratamientos. · El médico realiza un examen digital del recto para meme si hay algún problema allí o en el ano.  Se lubrica el recto y se introduce el colonosco

## 2018-12-21 RX ORDER — IBUPROFEN 600 MG/1
TABLET ORAL
Qty: 270 TABLET | Refills: 1 | OUTPATIENT
Start: 2018-12-21

## 2018-12-24 ENCOUNTER — HOSPITAL ENCOUNTER (OUTPATIENT)
Dept: GENERAL RADIOLOGY | Age: 53
Discharge: HOME | End: 2018-12-24
Attending: FAMILY MEDICINE
Payer: COMMERCIAL

## 2018-12-24 DIAGNOSIS — R91.1 LESION OF LUNG: ICD-10-CM

## 2018-12-24 PROCEDURE — 71046 X-RAY EXAM CHEST 2 VIEWS: CPT | Performed by: FAMILY MEDICINE

## 2018-12-26 ENCOUNTER — TELEPHONE (OUTPATIENT)
Dept: FAMILY MEDICINE CLINIC | Facility: CLINIC | Age: 53
End: 2018-12-26

## 2018-12-26 NOTE — TELEPHONE ENCOUNTER
----- Message from Sherif Lopez DO sent at 12/26/2018  8:58 AM CST -----  Follow up chest xray shows lung lesion was arthritic. No other follow up needed for finding.

## 2018-12-27 ENCOUNTER — APPOINTMENT (OUTPATIENT)
Dept: LAB | Age: 53
End: 2018-12-27
Attending: FAMILY MEDICINE
Payer: COMMERCIAL

## 2018-12-27 DIAGNOSIS — E66.9 DIABETES MELLITUS TYPE 2 IN OBESE (HCC): ICD-10-CM

## 2018-12-27 DIAGNOSIS — Z00.00 ROUTINE PHYSICAL EXAMINATION: ICD-10-CM

## 2018-12-27 DIAGNOSIS — E11.69 DIABETES MELLITUS TYPE 2 IN OBESE (HCC): ICD-10-CM

## 2018-12-27 PROCEDURE — 84460 ALANINE AMINO (ALT) (SGPT): CPT

## 2018-12-27 PROCEDURE — 80061 LIPID PANEL: CPT

## 2018-12-27 PROCEDURE — 80048 BASIC METABOLIC PNL TOTAL CA: CPT

## 2018-12-27 PROCEDURE — 82570 ASSAY OF URINE CREATININE: CPT

## 2018-12-27 PROCEDURE — 83036 HEMOGLOBIN GLYCOSYLATED A1C: CPT

## 2018-12-27 PROCEDURE — 36415 COLL VENOUS BLD VENIPUNCTURE: CPT

## 2018-12-27 PROCEDURE — 82043 UR ALBUMIN QUANTITATIVE: CPT

## 2018-12-27 PROCEDURE — 84450 TRANSFERASE (AST) (SGOT): CPT

## 2019-01-10 ENCOUNTER — OFFICE VISIT (OUTPATIENT)
Dept: FAMILY MEDICINE CLINIC | Facility: CLINIC | Age: 54
End: 2019-01-10

## 2019-01-10 ENCOUNTER — TELEPHONE (OUTPATIENT)
Dept: FAMILY MEDICINE CLINIC | Facility: CLINIC | Age: 54
End: 2019-01-10

## 2019-01-10 VITALS
SYSTOLIC BLOOD PRESSURE: 114 MMHG | DIASTOLIC BLOOD PRESSURE: 71 MMHG | BODY MASS INDEX: 28.89 KG/M2 | WEIGHT: 153 LBS | HEIGHT: 61 IN | HEART RATE: 97 BPM

## 2019-01-10 DIAGNOSIS — M25.511 CHRONIC RIGHT SHOULDER PAIN: ICD-10-CM

## 2019-01-10 DIAGNOSIS — E66.9 DIABETES MELLITUS TYPE 2 IN OBESE (HCC): Primary | ICD-10-CM

## 2019-01-10 DIAGNOSIS — G89.29 CHRONIC RIGHT SHOULDER PAIN: ICD-10-CM

## 2019-01-10 DIAGNOSIS — E11.69 DIABETES MELLITUS TYPE 2 IN OBESE (HCC): Primary | ICD-10-CM

## 2019-01-10 PROCEDURE — 99213 OFFICE O/P EST LOW 20 MIN: CPT | Performed by: FAMILY MEDICINE

## 2019-01-10 PROCEDURE — 99212 OFFICE O/P EST SF 10 MIN: CPT | Performed by: FAMILY MEDICINE

## 2019-01-10 NOTE — PROGRESS NOTES
Blood pressure 114/71, pulse 97, height 5' 1\" (1.549 m), weight 153 lb (69.4 kg). Patient presents today following up for diabetes hyperlipidemia hypertension. She walks all day at work.   She also reports that she has right shoulder pain that is inter

## 2019-01-10 NOTE — TELEPHONE ENCOUNTER
Patient scheduled an appointment with Dr. Gregg Solis but will need a new referrel, can you please enter one, thank you.

## 2019-01-28 RX ORDER — DULAGLUTIDE 1.5 MG/.5ML
INJECTION, SOLUTION SUBCUTANEOUS
Qty: 2 ML | Refills: 0 | Status: ON HOLD | OUTPATIENT
Start: 2019-01-28 | End: 2019-04-02

## 2019-01-28 NOTE — TELEPHONE ENCOUNTER
Di[facility]ante/Pharm calling for mutual pt regarding refill for rx:[id number],thanks.   Current Outpatient Medications:   •  TRULICITY 1.5 RX/4.6HY Subcutaneous Solution Pen-injector, INJECT 1.5 MG UNDER THE SKIN ONCE A WEEK, Disp: 2 mL, Rfl: 2

## 2019-01-28 NOTE — TELEPHONE ENCOUNTER
LOV 7/18/18 with recommended RTC 6 months - follow up 7/10/19. Pended 6 mo refill per [facility] protocol.

## 2019-01-31 ENCOUNTER — OFFICE VISIT (OUTPATIENT)
Dept: FAMILY MEDICINE CLINIC | Facility: CLINIC | Age: 54
End: 2019-01-31

## 2019-01-31 VITALS
DIASTOLIC BLOOD PRESSURE: 86 MMHG | BODY MASS INDEX: 28.79 KG/M2 | HEART RATE: 94 BPM | SYSTOLIC BLOOD PRESSURE: 131 MMHG | WEIGHT: 152.5 LBS | HEIGHT: 61 IN

## 2019-01-31 DIAGNOSIS — M75.101 TEAR OF RIGHT ROTATOR CUFF, UNSPECIFIED TEAR EXTENT: ICD-10-CM

## 2019-01-31 DIAGNOSIS — R19.8 GLOBUS SENSATION: Primary | ICD-10-CM

## 2019-01-31 PROCEDURE — 99214 OFFICE O/P EST MOD 30 MIN: CPT | Performed by: FAMILY MEDICINE

## 2019-01-31 PROCEDURE — 99212 OFFICE O/P EST SF 10 MIN: CPT | Performed by: FAMILY MEDICINE

## 2019-01-31 RX ORDER — OMEPRAZOLE 20 MG/1
20 CAPSULE, DELAYED RELEASE ORAL
Qty: 90 CAPSULE | Refills: 1 | Status: SHIPPED | OUTPATIENT
Start: 2019-01-31 | End: 2021-10-04

## 2019-01-31 RX ORDER — GLIMEPIRIDE 4 MG/1
TABLET ORAL
Qty: 90 TABLET | Refills: 0 | Status: SHIPPED | OUTPATIENT
Start: 2019-01-31 | End: 2019-05-04

## 2019-01-31 NOTE — PROGRESS NOTES
Blood pressure 131/86, pulse 94, height 5' 1\" (1.549 m), weight 152 lb 8 oz (69.2 kg). Patient presents today for skin tag removal.  History of diabetes recently started on Invokana and Trulicity.   She reports that she has noticed sensation of fullness

## 2019-02-05 ENCOUNTER — OFFICE VISIT (OUTPATIENT)
Dept: OTOLARYNGOLOGY | Facility: CLINIC | Age: 54
End: 2019-02-05

## 2019-02-05 VITALS
BODY MASS INDEX: 27.97 KG/M2 | TEMPERATURE: 98 F | SYSTOLIC BLOOD PRESSURE: 132 MMHG | HEIGHT: 62 IN | WEIGHT: 152 LBS | DIASTOLIC BLOOD PRESSURE: 74 MMHG

## 2019-02-05 DIAGNOSIS — R07.0 THROAT PAIN IN ADULT: Primary | ICD-10-CM

## 2019-02-05 PROCEDURE — 31575 DIAGNOSTIC LARYNGOSCOPY: CPT | Performed by: OTOLARYNGOLOGY

## 2019-02-05 PROCEDURE — 99212 OFFICE O/P EST SF 10 MIN: CPT | Performed by: OTOLARYNGOLOGY

## 2019-02-05 PROCEDURE — 99243 OFF/OP CNSLTJ NEW/EST LOW 30: CPT | Performed by: OTOLARYNGOLOGY

## 2019-02-05 RX ORDER — MONTELUKAST SODIUM 10 MG/1
10 TABLET ORAL NIGHTLY
Qty: 30 TABLET | Refills: 3 | Status: SHIPPED | OUTPATIENT
Start: 2019-02-05 | End: 2021-10-04

## 2019-02-05 RX ORDER — AZELASTINE 1 MG/ML
2 SPRAY, METERED NASAL 2 TIMES DAILY
Qty: 1 BOTTLE | Refills: 0 | Status: ON HOLD | OUTPATIENT
Start: 2019-02-05 | End: 2019-04-01

## 2019-02-05 RX ORDER — IBUPROFEN 600 MG/1
600 TABLET ORAL EVERY 8 HOURS PRN
Refills: 0 | COMMUNITY
Start: 2019-01-22 | End: 2021-10-04

## 2019-02-05 RX ORDER — OMEPRAZOLE 40 MG/1
40 CAPSULE, DELAYED RELEASE ORAL DAILY
Qty: 30 CAPSULE | Refills: 2 | Status: ON HOLD | OUTPATIENT
Start: 2019-02-05 | End: 2019-04-01

## 2019-02-05 RX ORDER — LORATADINE 10 MG/1
10 TABLET ORAL DAILY
Qty: 30 TABLET | Refills: 3 | Status: SHIPPED | OUTPATIENT
Start: 2019-02-05 | End: 2021-10-04

## 2019-02-05 NOTE — PROGRESS NOTES
Miriam Britton is a 48year old female.   Patient presents with:  Throat Problem: pt feels something stuck in her throat since last summer      [facility]  She presents with a sensation of something being caught in the back of her throat sinc Respiratory Negative Dyspnea and wheezing. Cardio Negative Chest pain, irregular heartbeat/palpitations and syncope. GI Negative Abdominal pain and diarrhea. Endocrine Negative Cold intolerance and heat intolerance. Neuro Negative Tremors.    Psyc Correct side and site confirmed. A topical spray of ). 25% Neosynephrine was sprayed into the nose. Laryngoscopy:  Flexible Fiberoptic Laryngoscopy: A diagnostic flexible fiberoptic laryngoscopy was performed.  The flexible fiberoptic laryngoscope wa 300 MG Oral Tab, Take 1 tablet by mouth once daily. , Disp: 90 tablet, Rfl: 1  •  Dulaglutide (TRULICITY) 1.5 JT/3.6KT Subcutaneous Solution Pen-injector, Inject 1.5 mg into the skin once a week., Disp: 12 pen, Rfl: 2  •  BD PEN NEEDLE SARAH U/F 32G X 4 MM D

## 2019-02-11 ENCOUNTER — OFFICE VISIT (OUTPATIENT)
Dept: ORTHOPEDICS CLINIC | Facility: CLINIC | Age: 54
End: 2019-02-11

## 2019-02-11 ENCOUNTER — OFFICE VISIT (OUTPATIENT)
Dept: PHYSICAL THERAPY | Age: 54
End: 2019-02-11
Attending: FAMILY MEDICINE
Payer: COMMERCIAL

## 2019-02-11 DIAGNOSIS — M25.511 CHRONIC RIGHT SHOULDER PAIN: ICD-10-CM

## 2019-02-11 DIAGNOSIS — G89.29 CHRONIC RIGHT SHOULDER PAIN: ICD-10-CM

## 2019-02-11 DIAGNOSIS — M75.101 ROTATOR CUFF SYNDROME OF RIGHT SHOULDER: Primary | ICD-10-CM

## 2019-02-11 PROCEDURE — 97161 PT EVAL LOW COMPLEX 20 MIN: CPT

## 2019-02-11 PROCEDURE — 99212 OFFICE O/P EST SF 10 MIN: CPT | Performed by: ORTHOPAEDIC SURGERY

## 2019-02-11 PROCEDURE — 99243 OFF/OP CNSLTJ NEW/EST LOW 30: CPT | Performed by: ORTHOPAEDIC SURGERY

## 2019-02-11 PROCEDURE — 97110 THERAPEUTIC EXERCISES: CPT

## 2019-02-11 NOTE — PROGRESS NOTES
P.T. EVALUATION:   Referring Physician: Dr. Arndt Friend  Diagnosis: Chronic right shoulder pain (M25.511,G89.29)  Date of Onset: July 2018 Date of Service: 2/11/2019     PATIENT SUMMARY   Marylu Dalal is a 48year old y/o female who presents to therapy toda R 150 deg, L 175 deg  ER:R 65 deg,  L 65 deg  IR: R 90 deg,  L 95 deg    Cervical ROM:  Flx: WNL/NE  Ext: WNL/NE  Rot: B min loss/NE  Lat flx: B WNL/NE    Accessory motion:   slight AP GH joint hypomobility noted on R    Strength/MMT:   Shoulder flx: R 5/5 furnished under this plan of treatment and while under my care.     X___________________________________________________ Date____________________    Certification From: 9/11/7835  To:5/12/2019

## 2019-02-11 NOTE — H&P
Chief Complaint: right shoulder pain    NURSING INTAKE COMMENTS: Patient presents with:  Consult: Right shoulder pain -- Onset 6-7 mths from twisting shoulder quickly. Xray taken 12/20/18. Tried 600 mg ibuprofen. Pt is scheduled for PT today for shoulder. Rotator Cuff resistance none +       Bicipital Groove none none       AC joint none none        Neurovascular status Intact Intact        Neck ROM WNL WNL   Myelopathic signs none none   Apprehension none none          Radiographs and Imaging: mild

## 2019-02-13 ENCOUNTER — OFFICE VISIT (OUTPATIENT)
Dept: PHYSICAL THERAPY | Age: 54
End: 2019-02-13
Attending: FAMILY MEDICINE
Payer: COMMERCIAL

## 2019-02-13 ENCOUNTER — TELEPHONE (OUTPATIENT)
Dept: FAMILY MEDICINE CLINIC | Facility: CLINIC | Age: 54
End: 2019-02-13

## 2019-02-13 DIAGNOSIS — M25.511 CHRONIC RIGHT SHOULDER PAIN: ICD-10-CM

## 2019-02-13 DIAGNOSIS — G89.29 CHRONIC RIGHT SHOULDER PAIN: ICD-10-CM

## 2019-02-13 DIAGNOSIS — R07.0 THROAT PAIN: Primary | ICD-10-CM

## 2019-02-13 PROCEDURE — 97110 THERAPEUTIC EXERCISES: CPT

## 2019-02-13 NOTE — PROGRESS NOTES
Diagnosis:  Chronic right shoulder pain (M25.511,G89.29)     Next MD visit: none scheduled  Fall Risk: standard         Precautions: n/a          Medication Changes since last visit?: No    Subjective: Pt reports 0/10 pain at rest and 4/01 pain with certai

## 2019-02-18 ENCOUNTER — OFFICE VISIT (OUTPATIENT)
Dept: PHYSICAL THERAPY | Age: 54
End: 2019-02-18
Attending: FAMILY MEDICINE
Payer: COMMERCIAL

## 2019-02-18 DIAGNOSIS — G89.29 CHRONIC RIGHT SHOULDER PAIN: ICD-10-CM

## 2019-02-18 DIAGNOSIS — M25.511 CHRONIC RIGHT SHOULDER PAIN: ICD-10-CM

## 2019-02-18 PROCEDURE — 97110 THERAPEUTIC EXERCISES: CPT

## 2019-02-18 NOTE — PROGRESS NOTES
Diagnosis:  Chronic right shoulder pain (M25.511,G89.29)     Next MD visit: none scheduled  Fall Risk: standard         Precautions: n/a          Medication Changes since last visit?: No    Subjective: Pt reports 0/10 pain today, but increased pain after l strengthening within pain free ranges. Pt required modification on some interventions to perform properly to activate her scapular stabilizers and to avoid compensations like shoulder hiking.  Also modified exercises to eliminate biceps activation/pain with

## 2019-02-20 ENCOUNTER — OFFICE VISIT (OUTPATIENT)
Dept: PHYSICAL THERAPY | Age: 54
End: 2019-02-20
Attending: FAMILY MEDICINE
Payer: COMMERCIAL

## 2019-02-20 DIAGNOSIS — G89.29 CHRONIC RIGHT SHOULDER PAIN: ICD-10-CM

## 2019-02-20 DIAGNOSIS — M25.511 CHRONIC RIGHT SHOULDER PAIN: ICD-10-CM

## 2019-02-20 PROCEDURE — 97110 THERAPEUTIC EXERCISES: CPT

## 2019-02-20 NOTE — PROGRESS NOTES
Diagnosis:  Chronic right shoulder pain (M25.511,G89.29)     Next MD visit: none scheduled  Fall Risk: standard         Precautions: n/a          Medication Changes since last visit?: No    Subjective: Pt reports 0/10 pain today and states her shoulder has ext with wand 15x  - standing B mid and high scap rows with Vabaduse 41 2x10 ea  - standing B shoulder ext with Vabaduse 41 2x10  - standing R shoulder ER with YTB 2x10  - standing R shoulder IR with YTB 2x10      Manual Therapy     -    Therapeutic Activity    - educatio

## 2019-02-25 ENCOUNTER — OFFICE VISIT (OUTPATIENT)
Dept: PHYSICAL THERAPY | Age: 54
End: 2019-02-25
Attending: FAMILY MEDICINE
Payer: COMMERCIAL

## 2019-02-25 DIAGNOSIS — M25.511 CHRONIC RIGHT SHOULDER PAIN: ICD-10-CM

## 2019-02-25 DIAGNOSIS — G89.29 CHRONIC RIGHT SHOULDER PAIN: ICD-10-CM

## 2019-02-25 PROCEDURE — 97110 THERAPEUTIC EXERCISES: CPT

## 2019-02-25 NOTE — PROGRESS NOTES
Diagnosis:  Chronic right shoulder pain (M25.511,G89.29)     Next MD visit: none scheduled  Fall Risk: standard         Precautions: n/a          Medication Changes since last visit?: No    Subjective: Pt reports 3/10 pain today and states she had no minim shoulder ER 3x10   - reassessment ROM  - standing wall wash with towel into shoulder flexion 1x10 (no pain)  - standing R pec stretch at wall 5x (pain d/c)  - standing R shoulder ER/IR with YTB 2x10 ea (IR performed with palm down)  - standing B shoulder e

## 2019-02-25 NOTE — TELEPHONE ENCOUNTER
Patient is seeing Dr. Valorie Gonzalez on 03/05/2019.  Referral approved by Bony Harman on 02/14/2019

## 2019-02-27 ENCOUNTER — OFFICE VISIT (OUTPATIENT)
Dept: PHYSICAL THERAPY | Age: 54
End: 2019-02-27
Attending: FAMILY MEDICINE
Payer: COMMERCIAL

## 2019-02-27 DIAGNOSIS — G89.29 CHRONIC RIGHT SHOULDER PAIN: ICD-10-CM

## 2019-02-27 DIAGNOSIS — M25.511 CHRONIC RIGHT SHOULDER PAIN: ICD-10-CM

## 2019-02-27 PROCEDURE — 97110 THERAPEUTIC EXERCISES: CPT

## 2019-02-27 NOTE — PROGRESS NOTES
Diagnosis:  Chronic right shoulder pain (M25.511,G89.29)     Next MD visit: none scheduled  Fall Risk: standard         Precautions: n/a          Medication Changes since last visit?: No    Subjective: Pt reports 0/10 shoulder pain today and states she did with AAS and 45 deg 1x5 ea for 10 sec holds  - sidelying R shoulder ER 3x10  - standing scap wall washes 2x10     - standing B shoulder ext with wand 2x10  - standing wall wash into shoulder flexion with towel 2x10  - standing R shoulder ER/IR with YTB 2x1 therapeutic exercises.     Plan: continue PT - possible progress note next session    Charges: Ex 3     Total Timed Treatment: 40 min  Total Treatment Time: 40 min

## 2019-03-04 ENCOUNTER — OFFICE VISIT (OUTPATIENT)
Dept: PHYSICAL THERAPY | Age: 54
End: 2019-03-04
Attending: FAMILY MEDICINE
Payer: COMMERCIAL

## 2019-03-04 DIAGNOSIS — M25.511 CHRONIC RIGHT SHOULDER PAIN: ICD-10-CM

## 2019-03-04 DIAGNOSIS — G89.29 CHRONIC RIGHT SHOULDER PAIN: ICD-10-CM

## 2019-03-04 PROCEDURE — 97110 THERAPEUTIC EXERCISES: CPT

## 2019-03-04 NOTE — PROGRESS NOTES
Diagnosis:  Chronic right shoulder pain (M25.511,G89.29)     Next MD visit: none scheduled  Fall Risk: standard         Precautions: n/a          Medication Changes since last visit?: No    Subjective: Pt reports her shoulder was good for a few days with n 3x10   - supine R shoulder flx with 2# 2x10 (pain free range)   - supine R shoulder ER/IR with RTB 3x10 ea  - supine R/L shoulder horizontal abd/add with 1# 2x10   - supine R shoulder rhythmic  stabilization with 2# AP, ML, CW/CCW 2x20 ea  - sidelying R sh GSC 2x10  - supine R/L shoulder ER with YTB 1x10 ea  - supine R shoulder rhythmic  stabilization with 1# AP, ML, CW/CCW 20x ea  - supine B shoulder AROM ER 2x10  - supine R shoulder ER isometrics with AAS and 45 deg 1x10  5 sec holds  - sidelying R shoulde

## 2019-03-05 ENCOUNTER — OFFICE VISIT (OUTPATIENT)
Dept: OTOLARYNGOLOGY | Facility: CLINIC | Age: 54
End: 2019-03-05

## 2019-03-05 ENCOUNTER — TELEPHONE (OUTPATIENT)
Dept: ORTHOPEDICS CLINIC | Facility: CLINIC | Age: 54
End: 2019-03-05

## 2019-03-05 VITALS — DIASTOLIC BLOOD PRESSURE: 82 MMHG | SYSTOLIC BLOOD PRESSURE: 114 MMHG | HEART RATE: 100 BPM

## 2019-03-05 DIAGNOSIS — R07.0 THROAT PAIN IN ADULT: Primary | ICD-10-CM

## 2019-03-05 DIAGNOSIS — M75.101 TEAR OF RIGHT ROTATOR CUFF, UNSPECIFIED TEAR EXTENT: Primary | ICD-10-CM

## 2019-03-05 PROCEDURE — 99212 OFFICE O/P EST SF 10 MIN: CPT | Performed by: OTOLARYNGOLOGY

## 2019-03-05 PROCEDURE — 99214 OFFICE O/P EST MOD 30 MIN: CPT | Performed by: OTOLARYNGOLOGY

## 2019-03-05 RX ORDER — FLUTICASONE PROPIONATE 50 MCG
1 SPRAY, SUSPENSION (ML) NASAL 2 TIMES DAILY
Qty: 1 BOTTLE | Refills: 3 | Status: ON HOLD | OUTPATIENT
Start: 2019-03-05 | End: 2019-04-01

## 2019-03-05 NOTE — TELEPHONE ENCOUNTER
MRI generated for right shoulder. Call to [facility]. No answer. Left voice message.  REquesting call back

## 2019-03-05 NOTE — PROGRESS NOTES
Miriam Britton is a 48year old female.   Patient presents with:  Throat Problem: f/u per pt no improvement, pt taking all medications expcept astelin nasal spray, per pt has headache when using nasal spray       HISTORY OF PRESENT ILLNESS  She presents with complication, not stated as uncontrolled        Past Surgical History:   Procedure Laterality Date   •   [id number]         REVIEW OF SYSTEMS    System Neg/Pos Details   Constitutional Negative Fatigue, fever and weight loss.    ENMT Negative D Septum -Normal  Turbinates - Right: Normal, Left: Normal.       Current Outpatient Medications:   •  Fluticasone Propionate 50 MCG/ACT Nasal Suspension, 1 spray by Nasal route 2 (two) times daily. , Disp: 1 Bottle, Rfl: 3  •  Omeprazole 40 MG Oral Capsule D route 2 (two) times daily. , Disp: 1 Bottle, Rfl: 0  ASSESSMENT AND PLAN    1. Throat pain in adult  50% better on current medications without using a spray. Start fluticasone twice daily return to see me in 2 months. You Fenton.  Kathy Son MD    3/5/2

## 2019-03-05 NOTE — TELEPHONE ENCOUNTER
Pt came to  [facility] to request an MRI order from Dr Mauricio Jones. Will complete PT tomorrow and still having pain. Please call to advise.  Please call after 2 pm.

## 2019-03-06 ENCOUNTER — OFFICE VISIT (OUTPATIENT)
Dept: PHYSICAL THERAPY | Age: 54
End: 2019-03-06
Attending: FAMILY MEDICINE
Payer: COMMERCIAL

## 2019-03-06 DIAGNOSIS — G89.29 CHRONIC RIGHT SHOULDER PAIN: ICD-10-CM

## 2019-03-06 DIAGNOSIS — M25.511 CHRONIC RIGHT SHOULDER PAIN: ICD-10-CM

## 2019-03-06 PROCEDURE — 97110 THERAPEUTIC EXERCISES: CPT

## 2019-03-06 NOTE — PROGRESS NOTES
Physical Therapy Progress Summary  Diagnosis:  Chronic right shoulder pain (M25.511,G89.29)     Next MD visit: none scheduled  Fall Risk: standard         Precautions: n/a          Medication Changes since last visit?: No   Assessment:  Patient seen for a 2x10  - standing B shoulder ER with Vabaduse 41 2x10  - standing B shoulder high scap rows GSC 3x10  - standing B shoulder ext with GSC 3x10  - serratus wall slides 2x10  - standing wall push ups 2x10   - standing B shoulder extension with wand 30x  - standing wal standing B shoulder ext with GSC 2x10  - standing B shoulder ext with cable pulley 20# 2x10  - standing B shoulder ER with Vabaduse 41 2x10  - standing B scap high rows with GSC 2x10   - supine R shoulder ER/IR with YTB 3x10 ea  - supine B chest press with wand 1. ext with wand 15x  - standing B mid and high scap rows with Vabaduse 41 2x10 ea  - standing B shoulder ext with Vabaduse 41 2x10  - standing R shoulder ER with YTB 2x10  - standing R shoulder IR with YTB 2x10      Manual Therapy         -    Therapeutic Activity        -

## 2019-03-07 NOTE — TELEPHONE ENCOUNTER
Spoke to pt and informed her that MRI right shoulder was approved by [facility]. Pt has [place] and instructed pt to go ahead and schedule MRI through [facility]. Gave [facility] phone #.  Advised pt to f/u in the office with [facility] after MRI and to allow at least a couple of days af

## 2019-03-12 ENCOUNTER — TELEPHONE (OUTPATIENT)
Dept: ORTHOPEDICS CLINIC | Facility: CLINIC | Age: 54
End: 2019-03-12

## 2019-03-12 ENCOUNTER — HOSPITAL ENCOUNTER (OUTPATIENT)
Dept: MRI IMAGING | Age: 54
Discharge: HOME | End: 2019-03-12
Attending: ORTHOPAEDIC SURGERY
Payer: COMMERCIAL

## 2019-03-12 DIAGNOSIS — M75.101 TEAR OF RIGHT ROTATOR CUFF, UNSPECIFIED TEAR EXTENT: ICD-10-CM

## 2019-03-12 NOTE — TELEPHONE ENCOUNTER
Can offer Valium or she can have the MRI with sedation/anesthesia. If Valium, order 10mg #1 to be taken 15 min prior. If she prefers IV sedation, please reorder with the sedation.

## 2019-03-12 NOTE — TELEPHONE ENCOUNTER
Pt states she was too nervous to have MRI done today - asking if there is another alternative  -Samoan speaking

## 2019-03-15 NOTE — TELEPHONE ENCOUNTER
Called demarco howard and s/w Lion ALBRIGHT#[phone] for Jamaican translation- she called pt and LMTCB

## 2019-03-27 RX ORDER — BLOOD SUGAR DIAGNOSTIC
STRIP MISCELLANEOUS
Qty: 200 STRIP | Refills: 3 | Status: SHIPPED | OUTPATIENT
Start: 2019-03-27 | End: 2020-07-06

## 2019-03-28 NOTE — TELEPHONE ENCOUNTER
Refill passed per [facility] protocol.   Refill Protocol Appointment Criteria  · Appointment scheduled in the past 12 months or in the next 3 months  Recent Outpatient Visits            3 weeks ago Chronic right shoulder pain    [facility]

## 2019-04-01 ENCOUNTER — HOSPITAL ENCOUNTER (INPATIENT)
Facility: HOSPITAL | Age: 54
LOS: 1 days | Discharge: HOME | DRG: 313 | End: 2019-04-02
Attending: EMERGENCY MEDICINE | Admitting: HOSPITALIST
Payer: COMMERCIAL

## 2019-04-01 ENCOUNTER — HOSPITAL ENCOUNTER (OUTPATIENT)
Age: 54
Discharge: ANOTHER HOSPITAL | End: 2019-04-01
Attending: EMERGENCY MEDICINE
Payer: COMMERCIAL

## 2019-04-01 VITALS
HEART RATE: 98 BPM | TEMPERATURE: 98 F | SYSTOLIC BLOOD PRESSURE: 139 MMHG | DIASTOLIC BLOOD PRESSURE: 80 MMHG | RESPIRATION RATE: 18 BRPM | OXYGEN SATURATION: 100 %

## 2019-04-01 DIAGNOSIS — R07.9 CHEST PAIN OF UNCERTAIN ETIOLOGY: Primary | ICD-10-CM

## 2019-04-01 PROCEDURE — 99204 OFFICE O/P NEW MOD 45 MIN: CPT

## 2019-04-01 PROCEDURE — 93010 ELECTROCARDIOGRAM REPORT: CPT

## 2019-04-01 PROCEDURE — 93005 ELECTROCARDIOGRAM TRACING: CPT

## 2019-04-01 PROCEDURE — 93010 ELECTROCARDIOGRAM REPORT: CPT | Performed by: EMERGENCY MEDICINE

## 2019-04-01 PROCEDURE — 99223 1ST HOSP IP/OBS HIGH 75: CPT | Performed by: HOSPITALIST

## 2019-04-01 PROCEDURE — 99214 OFFICE O/P EST MOD 30 MIN: CPT

## 2019-04-01 RX ORDER — NITROGLYCERIN 0.4 MG/1
0.4 TABLET SUBLINGUAL EVERY 5 MIN PRN
Status: DISCONTINUED | OUTPATIENT
Start: 2019-04-01 | End: 2019-04-02

## 2019-04-01 RX ORDER — ALPRAZOLAM 0.25 MG/1
0.25 TABLET ORAL ONCE AS NEEDED
Status: COMPLETED | OUTPATIENT
Start: 2019-04-02 | End: 2019-04-02

## 2019-04-01 RX ORDER — DEXTROSE MONOHYDRATE 25 G/50ML
50 INJECTION, SOLUTION INTRAVENOUS AS NEEDED
Status: DISCONTINUED | OUTPATIENT
Start: 2019-04-01 | End: 2019-04-02

## 2019-04-01 RX ORDER — SODIUM CHLORIDE 0.9 % (FLUSH) 0.9 %
3 SYRINGE (ML) INJECTION AS NEEDED
Status: DISCONTINUED | OUTPATIENT
Start: 2019-04-01 | End: 2019-04-02

## 2019-04-01 RX ORDER — DILTIAZEM HYDROCHLORIDE 5 MG/ML
10 INJECTION INTRAVENOUS SEE ADMIN INSTRUCTIONS
Status: DISCONTINUED | OUTPATIENT
Start: 2019-04-01 | End: 2019-04-02

## 2019-04-01 RX ORDER — METOPROLOL TARTRATE 100 MG/1
100 TABLET ORAL ONCE
Status: COMPLETED | OUTPATIENT
Start: 2019-04-02 | End: 2019-04-02

## 2019-04-01 RX ORDER — METOPROLOL TARTRATE 50 MG/1
50 TABLET, FILM COATED ORAL ONCE AS NEEDED
Status: COMPLETED | OUTPATIENT
Start: 2019-04-02 | End: 2019-04-02

## 2019-04-01 RX ORDER — PANTOPRAZOLE SODIUM 20 MG/1
20 TABLET, DELAYED RELEASE ORAL
Status: DISCONTINUED | OUTPATIENT
Start: 2019-04-02 | End: 2019-04-02

## 2019-04-01 RX ORDER — ONDANSETRON 2 MG/ML
4 INJECTION INTRAMUSCULAR; INTRAVENOUS EVERY 6 HOURS PRN
Status: DISCONTINUED | OUTPATIENT
Start: 2019-04-01 | End: 2019-04-02

## 2019-04-01 RX ORDER — ATORVASTATIN CALCIUM 40 MG/1
40 TABLET, FILM COATED ORAL NIGHTLY
Status: DISCONTINUED | OUTPATIENT
Start: 2019-04-01 | End: 2019-04-02

## 2019-04-01 RX ORDER — ASPIRIN 81 MG/1
81 TABLET ORAL DAILY
COMMUNITY

## 2019-04-01 RX ORDER — METOPROLOL TARTRATE 100 MG/1
100 TABLET ORAL ONCE AS NEEDED
Status: ACTIVE | OUTPATIENT
Start: 2019-04-01 | End: 2019-04-01

## 2019-04-01 RX ORDER — NITROGLYCERIN 0.4 MG/1
0.4 TABLET SUBLINGUAL ONCE
Status: COMPLETED | OUTPATIENT
Start: 2019-04-01 | End: 2019-04-02

## 2019-04-01 RX ORDER — GLIMEPIRIDE 2 MG/1
4 TABLET ORAL
Status: DISCONTINUED | OUTPATIENT
Start: 2019-04-02 | End: 2019-04-02

## 2019-04-01 RX ORDER — METOPROLOL TARTRATE 100 MG/1
100 TABLET ORAL ONCE
Status: COMPLETED | OUTPATIENT
Start: 2019-04-01 | End: 2019-04-01

## 2019-04-01 RX ORDER — ASPIRIN 81 MG/1
324 TABLET, CHEWABLE ORAL ONCE
Status: DISCONTINUED | OUTPATIENT
Start: 2019-04-01 | End: 2019-04-01

## 2019-04-01 RX ORDER — TEMAZEPAM 7.5 MG/1
7.5 CAPSULE ORAL NIGHTLY PRN
Status: DISCONTINUED | OUTPATIENT
Start: 2019-04-01 | End: 2019-04-02

## 2019-04-01 RX ORDER — METOPROLOL TARTRATE 50 MG/1
50 TABLET, FILM COATED ORAL ONCE
Status: COMPLETED | OUTPATIENT
Start: 2019-04-01 | End: 2019-04-01

## 2019-04-01 RX ORDER — METOPROLOL TARTRATE 100 MG/1
100 TABLET ORAL ONCE AS NEEDED
Status: DISCONTINUED | OUTPATIENT
Start: 2019-04-01 | End: 2019-04-02

## 2019-04-01 RX ORDER — METOPROLOL TARTRATE 5 MG/5ML
5 INJECTION INTRAVENOUS SEE ADMIN INSTRUCTIONS
Status: DISCONTINUED | OUTPATIENT
Start: 2019-04-01 | End: 2019-04-02

## 2019-04-01 RX ORDER — METOPROLOL TARTRATE 50 MG/1
50 TABLET, FILM COATED ORAL ONCE AS NEEDED
Status: ACTIVE | OUTPATIENT
Start: 2019-04-01 | End: 2019-04-01

## 2019-04-01 RX ORDER — ASPIRIN 325 MG
325 TABLET ORAL DAILY
Status: DISCONTINUED | OUTPATIENT
Start: 2019-04-01 | End: 2019-04-02

## 2019-04-01 RX ORDER — METOPROLOL TARTRATE 50 MG/1
50 TABLET, FILM COATED ORAL ONCE
Status: COMPLETED | OUTPATIENT
Start: 2019-04-02 | End: 2019-04-02

## 2019-04-01 RX ORDER — METOPROLOL TARTRATE 50 MG/1
50 TABLET, FILM COATED ORAL ONCE AS NEEDED
Status: DISCONTINUED | OUTPATIENT
Start: 2019-04-01 | End: 2019-04-02

## 2019-04-01 RX ORDER — ACETAMINOPHEN 325 MG/1
650 TABLET ORAL EVERY 6 HOURS PRN
Status: DISCONTINUED | OUTPATIENT
Start: 2019-04-01 | End: 2019-04-02

## 2019-04-01 NOTE — ED INITIAL ASSESSMENT (HPI)
C/o chest pain, dizziness, alex, L arm \"heaviness\" intermittently since yesterday, received 324 mg aspirin pta

## 2019-04-01 NOTE — ED PROVIDER NOTES
Patient Seen in: [facility] In [address] [place] [address]    History   Patient presents with:  Chest Pain Angina (cardiovascular)    Stated Complaint: chest pain/dizzy    HPI    The patient is a 51-year-old female with past history of hyperlipidemia, and flat bilaterally.   There is no posterior pharyngeal erythema  Chest: Clear to auscultation, no tenderness  Cardiovascular: Regular rate and rhythm without murmur  Abdomen: Soft, nontender and nondistended  Neurologic: Patient is awake, alert and orient

## 2019-04-01 NOTE — ED NOTES
Pt resting on cart, VSS, tachycardia noted. Pt c/o CP rated 2/10 to L side of chest, unchanged since arrival, pt states \"its not bad. \" Pt requesting food.

## 2019-04-01 NOTE — ED PROVIDER NOTES
Patient Seen in: [facility] 3w/sw    History   Patient presents with:  Chest Pain Angina (cardiovascular)    Stated Complaint: chest pain     HPI     54-year-old female with history of hyperlipidemia, diabetes presents for evaluation of chest pain Conjunctivae and EOM are normal.   Neck: Normal range of motion. Neck supple. Cardiovascular: Normal rate, regular rhythm, normal heart sounds and intact distal pulses. Pulmonary/Chest: Effort normal and breath sounds normal. No respiratory distress. (IXI=[id number])    (Results Pending)  CTA FRACTIONAL FLOW RESERVE ANALYSIS (CPT=0503T/0502T)    (Results Pending)    ED Medications Administered:   Medications   aspirin tab 325 mg (not administered)   nitroGLYCERIN (NITROSTAT) SL tab 0.4 mg (not administered) 117/79   BP Location:    Right arm   Pulse: 104 102 99 101   Resp: 14 13 16 16   Temp:    98.7 °F (37.1 °C)   TempSrc:    Oral   SpO2: 95% 95% 96% 95%   Weight:       Height:         *I personally reviewed and interpreted all ED vitals.         MDM   Differ disposition: Stable    Admission disposition: 4/1/2019  5:13 PM                 Disposition and Plan     Clinical Impression:  Chest pain of uncertain etiology  (primary encounter diagnosis)    Disposition:  Admit  4/1/2019  5:13 pm    Follow-up:  Aydee lui

## 2019-04-02 ENCOUNTER — APPOINTMENT (OUTPATIENT)
Dept: NUCLEAR MEDICINE | Facility: HOSPITAL | Age: 54
DRG: 313 | End: 2019-04-02
Attending: HOSPITALIST
Payer: COMMERCIAL

## 2019-04-02 ENCOUNTER — APPOINTMENT (OUTPATIENT)
Dept: CV DIAGNOSTICS | Facility: HOSPITAL | Age: 54
DRG: 313 | End: 2019-04-02
Attending: HOSPITALIST
Payer: COMMERCIAL

## 2019-04-02 ENCOUNTER — APPOINTMENT (OUTPATIENT)
Dept: CT IMAGING | Facility: HOSPITAL | Age: 54
DRG: 313 | End: 2019-04-02
Attending: HOSPITALIST
Payer: COMMERCIAL

## 2019-04-02 VITALS
HEART RATE: 79 BPM | HEIGHT: 60 IN | DIASTOLIC BLOOD PRESSURE: 72 MMHG | BODY MASS INDEX: 29.72 KG/M2 | WEIGHT: 151.38 LBS | TEMPERATURE: 98 F | RESPIRATION RATE: 16 BRPM | SYSTOLIC BLOOD PRESSURE: 117 MMHG | OXYGEN SATURATION: 96 %

## 2019-04-02 PROCEDURE — 93017 CV STRESS TEST TRACING ONLY: CPT | Performed by: HOSPITALIST

## 2019-04-02 PROCEDURE — 99239 HOSP IP/OBS DSCHRG MGMT >30: CPT | Performed by: HOSPITALIST

## 2019-04-02 PROCEDURE — 78452 HT MUSCLE IMAGE SPECT MULT: CPT | Performed by: HOSPITALIST

## 2019-04-02 RX ORDER — NITROGLYCERIN 0.4 MG/1
TABLET SUBLINGUAL
Status: COMPLETED
Start: 2019-04-02 | End: 2019-04-02

## 2019-04-02 RX ORDER — DILTIAZEM HYDROCHLORIDE 5 MG/ML
INJECTION INTRAVENOUS
Status: COMPLETED
Start: 2019-04-02 | End: 2019-04-02

## 2019-04-02 RX ORDER — 0.9 % SODIUM CHLORIDE 0.9 %
VIAL (ML) INJECTION
Status: COMPLETED
Start: 2019-04-02 | End: 2019-04-02

## 2019-04-02 RX ORDER — METOPROLOL TARTRATE 5 MG/5ML
INJECTION INTRAVENOUS
Status: COMPLETED
Start: 2019-04-02 | End: 2019-04-02

## 2019-04-02 NOTE — PLAN OF CARE
CARDIOVASCULAR - ADULT    • Maintains optimal cardiac output and hemodynamic stability Adequate for Discharge        Diabetes/Glucose Control    • Glucose maintained within prescribed range Adequate for Discharge        Patient Centered Care    • Patient p

## 2019-04-02 NOTE — H&P
[facility]    PATIENT'S NAME: [de-identified], TORSTEN   ATTENDING PHYSICIAN: Jeff Cohen MD   PATIENT ACCOUNT#:   [id number]    LOCATION:  [address] [place] [address] RECORD #:   [id number]       YOB: 1965  ADMISSION DATE:       04/01/2019 sclerae. Pupils equal, round, reactive to light and accommodation. Extraocular muscle movement intact. LUNGS:  Clear to auscultation bilaterally. Normal respiratory effort. No intercostal retractions. HEART:  Regular rate, rhythm.   S1 and S2 auscult

## 2019-04-02 NOTE — PLAN OF CARE
Problem: Patient Centered Care  Goal: Patient preferences are identified and integrated in the patient's plan of care  Interventions:  - What would you like us to know as we care for you?  I live with my son   - Provide timely, complete, and accurate inform bleeding and/or hematoma  - Assess quality of pulses, skin color and temperature  - Assess for signs of decreased coronary artery perfusion - ex.  Angina  - Evaluate fluid balance, assess for edema, trend weights  Outcome: Progressing      Comments: Patient

## 2019-04-02 NOTE — IMAGING NOTE
TO RAD HOLDING AT 0900. HX TAKEN PT CONSENTED PRIOR TO RAD HOLDING AREA ON INPATIENT UNIT.     18 GAUGE IV STARTED PRIOR TO HOLDING AREA ARRIVAL BY UNIT RN. SERUM TESTING COMPLETED GFR = 89    CREATINE = 0.76    CTA ORDERED BY DR. BARRIOS  WAS PT GIVEN C

## 2019-04-02 NOTE — DISCHARGE SUMMARY
[facility] HOSPITALIST  DISCHARGE SUMMARY     Fransisco Shirley Patient Status:  Inpatient    3/27/1965 MRN [id number]   Location [facility] 3W/SW Attending Leopoldo Bruns, MD   Hosp Day # 1 PCP Venkatesh Braun.  DO Analisa     DATE OF ADMISSION: 2019  PO breath sounds normal. No distress, wheezes, rales, rhonchi. Abd: Soft, NTND, BS normal, no mass, no HSM, no rebound/guarding. Neuro: Normal reflexes, CN. Sensory/motor exams grossly normal deficit.  Coordination  and gait normal.   MS: No joint effusions Tabs  Commonly known as:  CLARITIN      Take 1 tablet (10 mg total) by mouth daily.    Quantity:  30 tablet  Refills:  3     metFORMIN HCl 500 MG Tabs  Commonly known as:  GLUCOPHAGE      TAKE 2 TABLETS BY MOUTH TWICE DAILY WITH THE MORNING AND EVENING MEAL

## 2019-04-03 ENCOUNTER — PATIENT OUTREACH (OUTPATIENT)
Dept: CASE MANAGEMENT | Age: 54
End: 2019-04-03

## 2019-04-03 DIAGNOSIS — Z02.9 ENCOUNTERS FOR ADMINISTRATIVE PURPOSE: ICD-10-CM

## 2019-04-03 NOTE — PROGRESS NOTES
Initial Post Discharge Follow Up   Discharge Date: 4/2/19  Contact Date: 4/3/2019    Consent Verification:  Assessment Completed With: Patient  HIPAA Verified?   Yes    Discharge Dx:   Chest pain of uncertain etiology  Type 2 diabetes  Dyslipidemia  Right Canagliflozin (INVOKANA) 300 MG Oral Tab Take 1 tablet by mouth once daily. Disp: 90 tablet Rfl: 1   Dulaglutide (TRULICITY) 1.5 TY/0.5YP Subcutaneous Solution Pen-injector Inject 1.5 mg into the skin once a week.  Disp: 12 pen Rfl: 2   BD PEN NEEDLE SARAH attention with the patient. She states that she is fine, has no more chest pain and is back to her normal self. She did not want to complete med review but confirmed there were no changes. Patient wanted to schedule HFU for 4/18/2019.  She did not want a so

## 2019-04-08 ENCOUNTER — HOSPITAL ENCOUNTER (EMERGENCY)
Facility: HOSPITAL | Age: 54
Discharge: HOME | End: 2019-04-08
Attending: EMERGENCY MEDICINE
Payer: COMMERCIAL

## 2019-04-08 VITALS
HEART RATE: 80 BPM | SYSTOLIC BLOOD PRESSURE: 119 MMHG | TEMPERATURE: 98 F | RESPIRATION RATE: 18 BRPM | OXYGEN SATURATION: 94 % | DIASTOLIC BLOOD PRESSURE: 84 MMHG

## 2019-04-08 DIAGNOSIS — R07.89 CHEST PAIN, NON-CARDIAC: Primary | ICD-10-CM

## 2019-04-08 PROCEDURE — 36415 COLL VENOUS BLD VENIPUNCTURE: CPT

## 2019-04-08 PROCEDURE — 99284 EMERGENCY DEPT VISIT MOD MDM: CPT

## 2019-04-08 PROCEDURE — 85025 COMPLETE CBC W/AUTO DIFF WBC: CPT | Performed by: EMERGENCY MEDICINE

## 2019-04-08 PROCEDURE — 93005 ELECTROCARDIOGRAM TRACING: CPT

## 2019-04-08 PROCEDURE — 85379 FIBRIN DEGRADATION QUANT: CPT | Performed by: EMERGENCY MEDICINE

## 2019-04-08 PROCEDURE — 80048 BASIC METABOLIC PNL TOTAL CA: CPT | Performed by: EMERGENCY MEDICINE

## 2019-04-08 PROCEDURE — 82962 GLUCOSE BLOOD TEST: CPT

## 2019-04-08 PROCEDURE — 93010 ELECTROCARDIOGRAM REPORT: CPT | Performed by: EMERGENCY MEDICINE

## 2019-04-09 NOTE — ED PROVIDER NOTES
Patient Seen in: [facility] Emergency Department    History   Patient presents with:  Chest Pain Angina (cardiovascular)    Stated Complaint:     HPI    51-year-old female presents for evaluation of chest pain.   Patient was recently discharged las Cardiovascular: Normal rate, regular rhythm, normal heart sounds and intact distal pulses. Pulmonary/Chest: Effort normal and breath sounds normal. No respiratory distress. Abdominal: Soft. Bowel sounds are normal. She exhibits no distension.  There i obtain a history: None    Medical Record Review: I personally reviewed available prior medical records for any recent pertinent discharge summaries, testing, and procedures and reviewed those reports. Complicating Factors:  The patient already has does

## 2019-04-09 NOTE — ED NOTES
Received pt a/ox3, clear speech, nad, no resp distress, ambulatory with steady gait  Here with c/o midsternal CP now with dizziness.  Seen last week for same symptoms  Follow up appt is next thur with pcp    Placed on all continuous monitors  Awaiting md thierry

## 2019-04-15 RX ORDER — LANCETS
EACH MISCELLANEOUS
Qty: 100 EACH | Refills: 5 | Status: SHIPPED | OUTPATIENT
Start: 2019-04-15 | End: 2020-10-12

## 2019-04-15 NOTE — TELEPHONE ENCOUNTER
Current Outpatient Medications:  MICROLET LANCETS Does not apply Misc TEST AS DIRECTED TWICE DAILY Disp: 100 each Rfl: 11     Refill

## 2019-04-18 ENCOUNTER — TELEPHONE (OUTPATIENT)
Dept: FAMILY MEDICINE CLINIC | Facility: CLINIC | Age: 54
End: 2019-04-18

## 2019-04-18 ENCOUNTER — OFFICE VISIT (OUTPATIENT)
Dept: FAMILY MEDICINE CLINIC | Facility: CLINIC | Age: 54
End: 2019-04-18

## 2019-04-18 VITALS
HEIGHT: 62 IN | SYSTOLIC BLOOD PRESSURE: 109 MMHG | DIASTOLIC BLOOD PRESSURE: 74 MMHG | HEART RATE: 90 BPM | TEMPERATURE: 98 F | BODY MASS INDEX: 27.79 KG/M2 | WEIGHT: 151 LBS

## 2019-04-18 DIAGNOSIS — E11.69 DIABETES MELLITUS TYPE 2 IN OBESE (HCC): ICD-10-CM

## 2019-04-18 DIAGNOSIS — R13.10 DYSPHAGIA, UNSPECIFIED TYPE: Primary | ICD-10-CM

## 2019-04-18 DIAGNOSIS — E66.9 DIABETES MELLITUS TYPE 2 IN OBESE (HCC): ICD-10-CM

## 2019-04-18 PROCEDURE — 1111F DSCHRG MED/CURRENT MED MERGE: CPT | Performed by: FAMILY MEDICINE

## 2019-04-18 PROCEDURE — 99213 OFFICE O/P EST LOW 20 MIN: CPT | Performed by: FAMILY MEDICINE

## 2019-04-18 PROCEDURE — 99212 OFFICE O/P EST SF 10 MIN: CPT | Performed by: FAMILY MEDICINE

## 2019-04-18 NOTE — PROGRESS NOTES
Blood pressure 109/74, pulse 90, temperature 97.9 °F (36.6 °C), temperature source Oral, height 5' 2\" (1.575 m), weight 151 lb (68.5 kg). Presents today following up for hospital stay for chest pain. He reports last episode was 4 days ago.   Reports th

## 2019-04-18 NOTE — TELEPHONE ENCOUNTER
PLEASE ENTER REFERRAL TO OPEN MRI FOR RIGHT SHOULDER OF PATIENT. UNABLE TO TOLERATE CLOSED MRI ORDERED BY  [address] [place] [address].

## 2019-04-23 NOTE — TELEPHONE ENCOUNTER
No referral needed. LMOVM for patient advising her when she calls central scheduling to ask for the open MRI in [place]. This is in network with her insurance.

## 2019-04-30 ENCOUNTER — HOSPITAL ENCOUNTER (OUTPATIENT)
Dept: MAMMOGRAPHY | Age: 54
Discharge: HOME | End: 2019-04-30
Attending: FAMILY MEDICINE
Payer: COMMERCIAL

## 2019-04-30 DIAGNOSIS — Z12.31 SCREENING MAMMOGRAM, ENCOUNTER FOR: ICD-10-CM

## 2019-04-30 PROCEDURE — 77063 BREAST TOMOSYNTHESIS BI: CPT | Performed by: FAMILY MEDICINE

## 2019-04-30 PROCEDURE — 77067 SCR MAMMO BI INCL CAD: CPT | Performed by: FAMILY MEDICINE

## 2019-05-04 RX ORDER — GLIMEPIRIDE 4 MG/1
TABLET ORAL
Qty: 60 TABLET | Refills: 0 | Status: SHIPPED | OUTPATIENT
Start: 2019-05-04 | End: 2019-05-04

## 2019-05-04 NOTE — TELEPHONE ENCOUNTER
LOV 7/18/18 with follow up 7/10/19    Ok to refill through scheduled appt per [facility] protocol.

## 2019-05-06 RX ORDER — GLIMEPIRIDE 4 MG/1
TABLET ORAL
Qty: 90 TABLET | Refills: 0 | Status: SHIPPED | OUTPATIENT
Start: 2019-05-06 | End: 2019-09-13

## 2019-05-07 ENCOUNTER — OFFICE VISIT (OUTPATIENT)
Dept: OTOLARYNGOLOGY | Facility: CLINIC | Age: 54
End: 2019-05-07

## 2019-05-07 VITALS
WEIGHT: 150 LBS | TEMPERATURE: 98 F | SYSTOLIC BLOOD PRESSURE: 111 MMHG | DIASTOLIC BLOOD PRESSURE: 69 MMHG | HEIGHT: 61 IN | BODY MASS INDEX: 28.32 KG/M2

## 2019-05-07 DIAGNOSIS — R07.0 THROAT PAIN IN ADULT: Primary | ICD-10-CM

## 2019-05-07 PROCEDURE — 99214 OFFICE O/P EST MOD 30 MIN: CPT | Performed by: OTOLARYNGOLOGY

## 2019-05-07 PROCEDURE — 99212 OFFICE O/P EST SF 10 MIN: CPT | Performed by: OTOLARYNGOLOGY

## 2019-05-07 RX ORDER — OMEPRAZOLE 40 MG/1
CAPSULE, DELAYED RELEASE ORAL
Refills: 2 | COMMUNITY
Start: 2019-04-17 | End: 2019-08-13

## 2019-05-07 NOTE — PROGRESS NOTES
Jose Wright is a 47year old female. Patient presents with:   Follow - Up: regarding throat pain, improvement in symptoms       HISTORY OF PRESENT ILLNESS  She presents with a sensation of something being caught in the back of her throat since last summe Cancer Other         per NG:  Close relative    • Diabetes Brother    • Glaucoma Neg        Past Medical History:   Diagnosis Date   • Hyperlipidemia    • Type II or unspecified type diabetes mellitus without mention of complication, not stated as Nida Mustafa - Normal.        Lymph Detail Normal Submental. Submandibular. Anterior cervical. Posterior cervical. Supraclavicular.         Nose/Mouth/Throat Normal External nose - Normal. Lips/teeth/gums - Normal. Tonsils - Normal. Oropharynx - Normal.   Nose/Mouth/Thr adult  Overall 80% better on current medications. I have asked her to resume the use of her nasal spray and to continue with the loratadine and she will decide if she wishes to continue with omeprazole as she denies any GERD signs or symptoms.   Return to

## 2019-05-09 ENCOUNTER — HOSPITAL ENCOUNTER (OUTPATIENT)
Dept: MAMMOGRAPHY | Facility: HOSPITAL | Age: 54
Discharge: HOME | End: 2019-05-09
Attending: FAMILY MEDICINE
Payer: COMMERCIAL

## 2019-05-09 ENCOUNTER — HOSPITAL ENCOUNTER (OUTPATIENT)
Dept: ULTRASOUND IMAGING | Facility: HOSPITAL | Age: 54
Discharge: HOME | End: 2019-05-09
Attending: FAMILY MEDICINE
Payer: COMMERCIAL

## 2019-05-09 DIAGNOSIS — R92.8 ABNORMAL MAMMOGRAM: ICD-10-CM

## 2019-05-09 PROCEDURE — 77065 DX MAMMO INCL CAD UNI: CPT | Performed by: FAMILY MEDICINE

## 2019-05-09 PROCEDURE — 77061 BREAST TOMOSYNTHESIS UNI: CPT | Performed by: FAMILY MEDICINE

## 2019-05-09 PROCEDURE — 76642 ULTRASOUND BREAST LIMITED: CPT | Performed by: FAMILY MEDICINE

## 2019-06-01 RX ORDER — OMEPRAZOLE 40 MG/1
CAPSULE, DELAYED RELEASE ORAL
Qty: 90 CAPSULE | Refills: 2 | Status: SHIPPED | OUTPATIENT
Start: 2019-06-01

## 2019-06-10 ENCOUNTER — OFFICE VISIT (OUTPATIENT)
Dept: GASTROENTEROLOGY | Facility: CLINIC | Age: 54
End: 2019-06-10

## 2019-06-10 ENCOUNTER — TELEPHONE (OUTPATIENT)
Dept: GASTROENTEROLOGY | Facility: CLINIC | Age: 54
End: 2019-06-10

## 2019-06-10 VITALS
SYSTOLIC BLOOD PRESSURE: 113 MMHG | HEART RATE: 112 BPM | DIASTOLIC BLOOD PRESSURE: 76 MMHG | BODY MASS INDEX: 28.7 KG/M2 | HEIGHT: 61 IN | WEIGHT: 152 LBS

## 2019-06-10 DIAGNOSIS — R13.10 DYSPHAGIA, UNSPECIFIED TYPE: Primary | ICD-10-CM

## 2019-06-10 DIAGNOSIS — R19.8 GLOBUS SENSATION: ICD-10-CM

## 2019-06-10 PROCEDURE — 99212 OFFICE O/P EST SF 10 MIN: CPT | Performed by: INTERNAL MEDICINE

## 2019-06-10 PROCEDURE — 99243 OFF/OP CNSLTJ NEW/EST LOW 30: CPT | Performed by: INTERNAL MEDICINE

## 2019-06-10 NOTE — TELEPHONE ENCOUNTER
Scheduled for:  EGD 03639  Provider Name: Dr. Mily Palencia  Date:  8/13/19  Location:  [facility]  Sedation:  IV  Time:  8:45 am, arrival 7:45 am  Prep: NPO after midnight  Meds/Allergies Reconciled?:  Physician reviewed  Diagnosis with codes:  Dysphagia R13.10  Was patien

## 2019-06-10 NOTE — PATIENT INSTRUCTIONS
Schedule EGD (stomach endoscopy) exam at [facility]    This patient IS appropriate for the [facility] endoscopy center. Body mass index is 28.72 kg/m².     IV sedation (conscious sedation) if [facility]/Saturday  MAC an

## 2019-06-10 NOTE — PROGRESS NOTES
HPI:    Patient ID: Kana Marie is a 47year old woman with complex history of diabetes, on multiple diabetic medications below is referred by Dr. Nedra Menard for further evaluation of throat and swallowing symptoms.     Ms. Noreen Toledo describes at least sev Systems         Current Outpatient Medications:  OMEPRAZOLE 40 MG Oral Capsule Delayed Release TAKE 1 CAPSULE BY MOUTH ONCE DAILY BEFORE A MEAL Disp: 90 capsule Rfl: 2   GLIMEPIRIDE 4 MG Oral Tab TAKE 1 TABLET BY MOUTH EVERY MORNING BEFORE BREAKFAST Disp: rhythm. Pulmonary/Chest: Effort normal and breath sounds normal.   Abdominal: Soft. Bowel sounds are normal. She exhibits no distension. There is no tenderness. No epigastric tenderness   Lymphadenopathy:     She has no cervical adenopathy.    Neurologi examination on to the EGD examination which she wishes to complete. Ms. Fern Arenas defers colonoscopy exam at this point and would like to discuss later on. Follow-up with me in 3 months, likely after EGD examination. [place] office if possible.       No o

## 2019-06-12 ENCOUNTER — PATIENT OUTREACH (OUTPATIENT)
Dept: CASE MANAGEMENT | Age: 54
End: 2019-06-12

## 2019-06-12 NOTE — PROGRESS NOTES
Patient is due for cervical cancer screening, patient was not available and relative requested a call back at another time.

## 2019-06-17 ENCOUNTER — HOSPITAL ENCOUNTER (OUTPATIENT)
Dept: MRI IMAGING | Age: 54
Discharge: HOME | End: 2019-06-17
Attending: ORTHOPAEDIC SURGERY
Payer: COMMERCIAL

## 2019-06-17 DIAGNOSIS — M75.101 TEAR OF RIGHT ROTATOR CUFF, UNSPECIFIED TEAR EXTENT, UNSPECIFIED WHETHER TRAUMATIC: ICD-10-CM

## 2019-06-17 PROCEDURE — 73221 MRI JOINT UPR EXTREM W/O DYE: CPT | Performed by: ORTHOPAEDIC SURGERY

## 2019-06-19 ENCOUNTER — TELEPHONE (OUTPATIENT)
Dept: ORTHOPEDICS CLINIC | Facility: CLINIC | Age: 54
End: 2019-06-19

## 2019-06-19 NOTE — TELEPHONE ENCOUNTER
----- Message from Carlos Oliva MD sent at 6/17/2019  2:04 PM CDT -----  Results reviewed, please mail results to the patient and let them know that I can discuss at their follow up visit (unless my note states specifically that I would call them).

## 2019-06-24 ENCOUNTER — HOSPITAL ENCOUNTER (OUTPATIENT)
Age: 54
Discharge: HOME | End: 2019-06-24
Attending: EMERGENCY MEDICINE
Payer: COMMERCIAL

## 2019-06-24 VITALS
WEIGHT: 152 LBS | RESPIRATION RATE: 18 BRPM | TEMPERATURE: 98 F | SYSTOLIC BLOOD PRESSURE: 136 MMHG | HEART RATE: 70 BPM | DIASTOLIC BLOOD PRESSURE: 84 MMHG | OXYGEN SATURATION: 98 % | BODY MASS INDEX: 27.97 KG/M2 | HEIGHT: 62 IN

## 2019-06-24 DIAGNOSIS — R47.02 DYSPHASIA: Primary | ICD-10-CM

## 2019-06-24 DIAGNOSIS — J30.2 SEASONAL ALLERGIES: ICD-10-CM

## 2019-06-24 PROCEDURE — 99212 OFFICE O/P EST SF 10 MIN: CPT

## 2019-06-24 RX ORDER — MOMETASONE 50 UG/1
1 SPRAY, METERED NASAL DAILY
Qty: 1 BOTTLE | Refills: 0 | Status: SHIPPED | OUTPATIENT
Start: 2019-06-24 | End: 2021-10-04

## 2019-06-25 NOTE — ED PROVIDER NOTES
Patient Seen in: [facility] Immediate Care In [address] [place] [address]    History   Patient presents with:  FB in Throat (GI, respiratory)    Stated Complaint: something in throat    HPI  Patient complains of constant sensation of needing to clear her throat, suc (Temporal)   Resp 18   Ht 157.5 cm (5' 2\")   Wt 68.9 kg   SpO2 98%   BMI 27.80 kg/m²         Physical Exam   Constitutional: She is oriented to person, place, and time. She appears well-developed and well-nourished. No distress.    Well appearing   HENT: Plan     Clinical Impression:  Dysphasia  (primary encounter diagnosis)    Disposition:  Discharge  6/24/2019  7:19 pm    Follow-up:  Marion Lerma DO  [id number] [phone]    In 1 day  For follow-up        Medicat

## 2019-07-09 NOTE — TELEPHONE ENCOUNTER
Rescheduled for:  EGD 93847  Provider Name: Dr. George Montgomery  Date:    From-8/13/19  To-8/6/19  Location:  [address] [place] [address]  Sedation:  IV  Time:    From-0845  [id number] (pt is aware to arrive at 0645)   Prep:  NPO after midnight, mailed 7/10/19  Meds/Allergies Reconciled?:  P

## 2019-07-10 ENCOUNTER — OFFICE VISIT (OUTPATIENT)
Dept: ENDOCRINOLOGY CLINIC | Facility: CLINIC | Age: 54
End: 2019-07-10

## 2019-07-10 VITALS
BODY MASS INDEX: 28 KG/M2 | SYSTOLIC BLOOD PRESSURE: 109 MMHG | DIASTOLIC BLOOD PRESSURE: 76 MMHG | HEART RATE: 88 BPM | WEIGHT: 152 LBS

## 2019-07-10 DIAGNOSIS — E11.9 TYPE 2 DIABETES MELLITUS WITHOUT COMPLICATION, WITH LONG-TERM CURRENT USE OF INSULIN (HCC): Primary | ICD-10-CM

## 2019-07-10 DIAGNOSIS — Z79.4 TYPE 2 DIABETES MELLITUS WITHOUT COMPLICATION, WITH LONG-TERM CURRENT USE OF INSULIN (HCC): Primary | ICD-10-CM

## 2019-07-10 LAB
CARTRIDGE LOT#: ABNORMAL NUMERIC
GLUCOSE BLOOD: 108
HEMOGLOBIN A1C: 7.8 % (ref 4.3–5.6)
TEST STRIP LOT #: NORMAL NUMERIC

## 2019-07-10 PROCEDURE — 83036 HEMOGLOBIN GLYCOSYLATED A1C: CPT | Performed by: INTERNAL MEDICINE

## 2019-07-10 PROCEDURE — 82962 GLUCOSE BLOOD TEST: CPT | Performed by: INTERNAL MEDICINE

## 2019-07-10 PROCEDURE — 36416 COLLJ CAPILLARY BLOOD SPEC: CPT | Performed by: INTERNAL MEDICINE

## 2019-07-10 PROCEDURE — 99213 OFFICE O/P EST LOW 20 MIN: CPT | Performed by: INTERNAL MEDICINE

## 2019-07-10 NOTE — PROGRESS NOTES
Name: Segundo Gipson  Date: 7/10/2019    Referring Physician: No ref. provider found    HISTORY OF PRESENT ILLNESS   Segundo Gipson is a 47year old female who presents for diabetes mellitus.       Prior HbA, C or glycohemoglobin were 8.2% 5/2014; 7.2% 8/2014 LANCETS Does not apply Misc, TEST AS DIRECTED TWICE DAILY, Disp: 100 each, Rfl: 5  •  aspirin 81 MG Oral Tab EC, Take 81 mg by mouth daily. , Disp: , Rfl:   •  CONTOUR NEXT TEST In Vitro Strip, TEST TWICE DAILY, Disp: 200 strip, Rfl: 3  •  loratadine 10 MG uncontrolled        Surgical history:   Past Surgical History:   Procedure Laterality Date   •   [id number]         PHYSICAL EXAM  /76   Pulse 88   Wt 152 lb (68.9 kg)   BMI 27.80 kg/m²     General Appearance:  alert, well developed, in

## 2019-07-15 NOTE — TELEPHONE ENCOUNTER
Called pt LM w/ family member to CB to office.  If pt CB please offer her appt w/ [facility] for MRI results

## 2019-07-19 RX ORDER — OMEPRAZOLE 40 MG/1
CAPSULE, DELAYED RELEASE ORAL
Qty: 90 CAPSULE | Refills: 0 | Status: SHIPPED | OUTPATIENT
Start: 2019-07-19 | End: 2019-08-13

## 2019-08-06 ENCOUNTER — HOSPITAL ENCOUNTER (OUTPATIENT)
Facility: HOSPITAL | Age: 54
Setting detail: HOSPITAL OUTPATIENT SURGERY
Discharge: HOME | End: 2019-08-06
Attending: INTERNAL MEDICINE | Admitting: INTERNAL MEDICINE
Payer: COMMERCIAL

## 2019-08-06 DIAGNOSIS — R13.10 DYSPHAGIA, UNSPECIFIED TYPE: ICD-10-CM

## 2019-08-06 LAB
CLO TEST: NEGATIVE
GLUCOSE BLDC GLUCOMTR-MCNC: 129 MG/DL (ref 70–99)

## 2019-08-06 PROCEDURE — 43239 EGD BIOPSY SINGLE/MULTIPLE: CPT | Performed by: INTERNAL MEDICINE

## 2019-08-06 PROCEDURE — 0DB68ZX EXCISION OF STOMACH, VIA NATURAL OR ARTIFICIAL OPENING ENDOSCOPIC, DIAGNOSTIC: ICD-10-PCS | Performed by: INTERNAL MEDICINE

## 2019-08-06 PROCEDURE — G0500 MOD SEDAT ENDO SERVICE >5YRS: HCPCS | Performed by: INTERNAL MEDICINE

## 2019-08-06 RX ORDER — SODIUM CHLORIDE 0.9 % (FLUSH) 0.9 %
10 SYRINGE (ML) INJECTION AS NEEDED
Status: DISCONTINUED | OUTPATIENT
Start: 2019-08-06 | End: 2019-08-06

## 2019-08-06 RX ORDER — SODIUM CHLORIDE, SODIUM LACTATE, POTASSIUM CHLORIDE, CALCIUM CHLORIDE 600; 310; 30; 20 MG/100ML; MG/100ML; MG/100ML; MG/100ML
INJECTION, SOLUTION INTRAVENOUS CONTINUOUS
Status: DISCONTINUED | OUTPATIENT
Start: 2019-08-06 | End: 2019-08-06

## 2019-08-06 RX ORDER — MIDAZOLAM HYDROCHLORIDE 1 MG/ML
INJECTION INTRAMUSCULAR; INTRAVENOUS
Status: DISCONTINUED | OUTPATIENT
Start: 2019-08-06 | End: 2019-08-06

## 2019-08-06 RX ORDER — MIDAZOLAM HYDROCHLORIDE 1 MG/ML
1 INJECTION INTRAMUSCULAR; INTRAVENOUS EVERY 5 MIN PRN
Status: DISCONTINUED | OUTPATIENT
Start: 2019-08-06 | End: 2019-08-06

## 2019-08-06 NOTE — OPERATIVE REPORT
EGD PROCEDURE REPORT    DATE OF PROCEDURE:  8/6/2019    PCP: Tracy Richard DO     PREOPERATIVE DIAGNOSIS: Dysphagia, globus symptom     POSTOPERATIVE DIAGNOSIS:  See impression. SURGEON:  JACKIE Butcher:      Yahaira valentino

## 2019-08-07 VITALS
WEIGHT: 150 LBS | RESPIRATION RATE: 16 BRPM | HEART RATE: 81 BPM | OXYGEN SATURATION: 96 % | HEIGHT: 62 IN | BODY MASS INDEX: 27.6 KG/M2 | DIASTOLIC BLOOD PRESSURE: 73 MMHG | SYSTOLIC BLOOD PRESSURE: 106 MMHG

## 2019-08-13 ENCOUNTER — OFFICE VISIT (OUTPATIENT)
Dept: OTOLARYNGOLOGY | Facility: CLINIC | Age: 54
End: 2019-08-13

## 2019-08-13 VITALS
HEIGHT: 62 IN | TEMPERATURE: 97 F | BODY MASS INDEX: 27.6 KG/M2 | SYSTOLIC BLOOD PRESSURE: 101 MMHG | DIASTOLIC BLOOD PRESSURE: 69 MMHG | WEIGHT: 150 LBS

## 2019-08-13 DIAGNOSIS — R07.0 THROAT PAIN IN ADULT: Primary | ICD-10-CM

## 2019-08-13 PROCEDURE — 99214 OFFICE O/P EST MOD 30 MIN: CPT | Performed by: OTOLARYNGOLOGY

## 2019-08-13 RX ORDER — FLUTICASONE PROPIONATE 50 MCG
SPRAY, SUSPENSION (ML) NASAL
Refills: 3 | COMMUNITY
Start: 2019-06-26 | End: 2021-10-04

## 2019-08-13 NOTE — PROGRESS NOTES
Benji Gardner is a 47year old female. Patient presents with:   Follow - Up: regarding throat pain, pt states no change in symptoms since last visit       [facility]  She presents with a sensation of something being caught in the back of he of children: Not on file      Years of education: Not on file      Highest education level: Not on file    Tobacco Use      Smoking status: Current Every Day Smoker        Years: 30.00      Smokeless tobacco: Never Used      Tobacco comment: 3 cigs a day Orientation - Oriented to time, place, person & situation. Appropriate mood and affect.    Neck Exam Normal Inspection - Normal. Palpation - Normal. Parotid gland - Normal. Thyroid gland - Normal.   Eyes Normal Conjunctiva - Right: Normal, Left: Normal. Pup total) by mouth nightly., Disp: 180 tablet, Rfl: 1  •  Canagliflozin (INVOKANA) 300 MG Oral Tab, Take 1 tablet by mouth once daily. , Disp: 90 tablet, Rfl: 1  •  Dulaglutide (TRULICITY) 1.5 AS/8.6FZ Subcutaneous Solution Pen-injector, Inject 1.5 mg into the

## 2019-08-20 RX ORDER — CANAGLIFLOZIN 300 MG/1
TABLET, FILM COATED ORAL
Qty: 30 TABLET | Refills: 5 | Status: SHIPPED | OUTPATIENT
Start: 2019-08-20 | End: 2020-03-02

## 2019-08-27 ENCOUNTER — TELEPHONE (OUTPATIENT)
Dept: GASTROENTEROLOGY | Facility: CLINIC | Age: 54
End: 2019-08-27

## 2019-09-13 RX ORDER — GLIMEPIRIDE 4 MG/1
TABLET ORAL
Qty: 90 TABLET | Refills: 0 | Status: SHIPPED | OUTPATIENT
Start: 2019-09-13 | End: 2020-01-13

## 2019-09-30 ENCOUNTER — OFFICE VISIT (OUTPATIENT)
Dept: OPTOMETRY | Facility: CLINIC | Age: 54
End: 2019-09-30

## 2019-09-30 DIAGNOSIS — E11.9 TYPE 2 DIABETES MELLITUS WITHOUT COMPLICATION, WITHOUT LONG-TERM CURRENT USE OF INSULIN (HCC): Primary | ICD-10-CM

## 2019-09-30 DIAGNOSIS — H52.4 PRESBYOPIA: ICD-10-CM

## 2019-09-30 DIAGNOSIS — H25.13 AGE-RELATED NUCLEAR CATARACT OF BOTH EYES: ICD-10-CM

## 2019-09-30 PROCEDURE — 92014 COMPRE OPH EXAM EST PT 1/>: CPT | Performed by: OPTOMETRIST

## 2019-09-30 NOTE — PROGRESS NOTES
Benji Gardner is a 47year old female. HPI:     HPI     Diabetic Eye Exam     Diabetes characteristics include controlled with diet, taking oral medications and Type 2. Duration of 7 years. Number of years diabetic 7. Number of years on pills 7.   Num METFORMIN  MG Oral Tab TAKE 2 TABLETS BY MOUTH TWICE DAILY WITH THE MORNING AND EVENING MEAL Disp: 360 tablet Rfl: 0   OMEPRAZOLE 40 MG Oral Capsule Delayed Release TAKE 1 CAPSULE BY MOUTH ONCE DAILY BEFORE A MEAL Disp: 90 capsule Rfl: 2   [facility] PERRL    Left PERRL          Visual Fields       Left Right     Full Full          Extraocular Movement       Right Left     Full, Ortho Full, Ortho          Neuro/Psych     Oriented x3:  Yes    Mood/Affect:  Normal          Dilation     Both eyes:  1.0% M the defined types were placed in this encounter.       Meds This Visit:  Requested Prescriptions      No prescriptions requested or ordered in this encounter        Follow up instructions:  Return in about 1 year (around 9/30/2020) for Diabetic Eye exam.

## 2019-10-11 RX ORDER — DULAGLUTIDE 1.5 MG/.5ML
INJECTION, SOLUTION SUBCUTANEOUS
Qty: 6 ML | Refills: 0 | Status: SHIPPED | OUTPATIENT
Start: 2019-10-11 | End: 2020-01-08

## 2019-10-14 ENCOUNTER — APPOINTMENT (OUTPATIENT)
Dept: LAB | Age: 54
End: 2019-10-14
Attending: FAMILY MEDICINE
Payer: COMMERCIAL

## 2019-10-14 DIAGNOSIS — E66.9 DIABETES MELLITUS TYPE 2 IN OBESE (HCC): ICD-10-CM

## 2019-10-14 DIAGNOSIS — R13.10 DYSPHAGIA, UNSPECIFIED TYPE: ICD-10-CM

## 2019-10-14 DIAGNOSIS — E11.69 DIABETES MELLITUS TYPE 2 IN OBESE (HCC): ICD-10-CM

## 2019-10-14 PROCEDURE — 84460 ALANINE AMINO (ALT) (SGPT): CPT

## 2019-10-14 PROCEDURE — 83036 HEMOGLOBIN GLYCOSYLATED A1C: CPT

## 2019-10-14 PROCEDURE — 80048 BASIC METABOLIC PNL TOTAL CA: CPT

## 2019-10-14 PROCEDURE — 36415 COLL VENOUS BLD VENIPUNCTURE: CPT

## 2019-10-14 PROCEDURE — 80061 LIPID PANEL: CPT

## 2019-10-14 PROCEDURE — 84450 TRANSFERASE (AST) (SGOT): CPT

## 2019-10-14 RX ORDER — ATORVASTATIN CALCIUM 20 MG/1
TABLET, FILM COATED ORAL
Qty: 180 TABLET | Refills: 0 | Status: SHIPPED | OUTPATIENT
Start: 2019-10-14 | End: 2020-01-13

## 2019-11-21 ENCOUNTER — TELEPHONE (OUTPATIENT)
Dept: FAMILY MEDICINE CLINIC | Facility: CLINIC | Age: 54
End: 2019-11-21

## 2019-11-21 DIAGNOSIS — R92.8 ABNORMAL MAMMOGRAM: Primary | ICD-10-CM

## 2019-11-22 NOTE — TELEPHONE ENCOUNTER
Per last mammogram on 5/9/19::If no interval change in clinical breast examination, recommend 6 month follow-up diagnostic left breast ultrasound November 2019.      Please review pended order, thanks

## 2019-12-09 ENCOUNTER — HOSPITAL ENCOUNTER (OUTPATIENT)
Dept: ULTRASOUND IMAGING | Facility: HOSPITAL | Age: 54
Discharge: HOME | End: 2019-12-09
Attending: FAMILY MEDICINE
Payer: COMMERCIAL

## 2019-12-09 ENCOUNTER — HOSPITAL ENCOUNTER (OUTPATIENT)
Dept: MAMMOGRAPHY | Facility: HOSPITAL | Age: 54
Discharge: HOME | End: 2019-12-09
Attending: FAMILY MEDICINE
Payer: COMMERCIAL

## 2019-12-09 DIAGNOSIS — R92.8 ABNORMAL MAMMOGRAM: ICD-10-CM

## 2019-12-09 DIAGNOSIS — R92.2 INCONCLUSIVE MAMMOGRAM: ICD-10-CM

## 2019-12-09 PROCEDURE — 76642 ULTRASOUND BREAST LIMITED: CPT | Performed by: FAMILY MEDICINE

## 2019-12-10 ENCOUNTER — TELEPHONE (OUTPATIENT)
Dept: FAMILY MEDICINE CLINIC | Facility: CLINIC | Age: 54
End: 2019-12-10

## 2019-12-10 NOTE — TELEPHONE ENCOUNTER
----- Message from Tracey Tolbert DO sent at 12/9/2019  4:43 PM CST -----  Fu left breast us and screening mammo.

## 2020-01-08 ENCOUNTER — OFFICE VISIT (OUTPATIENT)
Dept: ENDOCRINOLOGY CLINIC | Facility: CLINIC | Age: 55
End: 2020-01-08

## 2020-01-08 VITALS
BODY MASS INDEX: 27 KG/M2 | WEIGHT: 149 LBS | DIASTOLIC BLOOD PRESSURE: 78 MMHG | SYSTOLIC BLOOD PRESSURE: 117 MMHG | HEART RATE: 99 BPM

## 2020-01-08 DIAGNOSIS — Z96.41 TYPE 2 DIABETES MELLITUS WITHOUT COMPLICATION, WITH LONG TERM CURRENT USE OF INSULIN PUMP (HCC): Primary | ICD-10-CM

## 2020-01-08 DIAGNOSIS — E11.9 TYPE 2 DIABETES MELLITUS WITHOUT COMPLICATION, WITH LONG TERM CURRENT USE OF INSULIN PUMP (HCC): Primary | ICD-10-CM

## 2020-01-08 LAB
CARTRIDGE LOT#: ABNORMAL NUMERIC
GLUCOSE BLOOD: 231
HEMOGLOBIN A1C: 8 % (ref 4.3–5.6)
TEST STRIP LOT #: NORMAL NUMERIC

## 2020-01-08 PROCEDURE — 82962 GLUCOSE BLOOD TEST: CPT | Performed by: INTERNAL MEDICINE

## 2020-01-08 PROCEDURE — 99214 OFFICE O/P EST MOD 30 MIN: CPT | Performed by: INTERNAL MEDICINE

## 2020-01-08 PROCEDURE — 36416 COLLJ CAPILLARY BLOOD SPEC: CPT | Performed by: INTERNAL MEDICINE

## 2020-01-08 PROCEDURE — 83036 HEMOGLOBIN GLYCOSYLATED A1C: CPT | Performed by: INTERNAL MEDICINE

## 2020-01-08 NOTE — PROGRESS NOTES
Name: Jeyson Wild  Date: 1/8/2020    Referring Physician: No ref. provider found    HISTORY OF PRESENT ILLNESS   Jeyson Wild is a 47year old female who presents for diabetes mellitus.       Prior HbA, C or glycohemoglobin were 8.2% 5/2014; 7.2% 8/2014; SHAKE LQ AND U 1 SPR NASALLY BID, Disp: , Rfl: 3  •  Mometasone Furoate (NASONEX) 50 MCG/ACT Nasal Suspension, 1 spray by Nasal route daily.  1 spray each nostril daily, Disp: 1 Bottle, Rfl: 0  •  OMEPRAZOLE 40 MG Oral Capsule Delayed Release, TAKE 1 CAPSUL Topics      Concerns:        Caffeine Concern: Yes          2cups coffee, [id number]      Medical History:   Past Medical History:   Diagnosis Date   • High cholesterol    • Hyperlipidemia    • Type II or unspecified type diabetes mellitus without mention of visit   -Continue Metformin and Glimepiride  -Continue Invokana 300mg PO daily, tolerating well  -Normotensive  -Normal labs  -Normal foot exam performed 7/2019  -UTD with optho    This is a 25 minute visit and greater than 50% of the time was spent counse

## 2020-01-13 RX ORDER — GLIMEPIRIDE 4 MG/1
TABLET ORAL
Qty: 90 TABLET | Refills: 0 | Status: SHIPPED | OUTPATIENT
Start: 2020-01-13 | End: 2020-05-05

## 2020-01-13 RX ORDER — ATORVASTATIN CALCIUM 20 MG/1
TABLET, FILM COATED ORAL
Qty: 180 TABLET | Refills: 0 | Status: SHIPPED | OUTPATIENT
Start: 2020-01-13 | End: 2021-03-10

## 2020-03-02 RX ORDER — CANAGLIFLOZIN 300 MG/1
TABLET, FILM COATED ORAL
Qty: 30 TABLET | Refills: 5 | Status: SHIPPED | OUTPATIENT
Start: 2020-03-02 | End: 2020-09-04

## 2020-05-04 ENCOUNTER — TELEPHONE (OUTPATIENT)
Dept: ENDOCRINOLOGY CLINIC | Facility: CLINIC | Age: 55
End: 2020-05-04

## 2020-05-04 NOTE — TELEPHONE ENCOUNTER
Patient indicates rx:Ozempic is causing her to have stomach pain. Patient rescheduled Tele Visit to 5/13/20 due to work. Patient is on hold, indicates due to work she can not take any calls, patient requires , thanks.

## 2020-05-04 NOTE — TELEPHONE ENCOUNTER
Dr. Alfred Jolley,     Patient called c/o generalized abdominal pain currently rated at 5/10 (after taking anti-acid) x1 week with some nausea bloating, left flank pain, no dysuria, no fever, no diarrhea. Also states she has no appetite.   She suspects that this i

## 2020-05-04 NOTE — TELEPHONE ENCOUNTER
[facility], thank you for clarification. IN that case please go ahead and stop the Ozempic. Please call if symptoms do not improve after stopping the medication. Thanks.

## 2020-05-04 NOTE — TELEPHONE ENCOUNTER
Dr. Carlos Schafer,     I'm sorry. To clarify, she stated she was having the symptoms also in January, but were tolerable and would notice them after weekly dose and symptoms would go away 2-3 days after.   States this time the abdominal pain, loss of appetite, and

## 2020-05-04 NOTE — TELEPHONE ENCOUNTER
Ok, noted. But just to confirm she started Ozempic in January and symptoms did not start to occur until one week ago? Ok to continue to [employer] however I do think she should also follow up with PCP given acute pain.   If symptoms were due to [facility]

## 2020-05-05 RX ORDER — GLIMEPIRIDE 4 MG/1
4 TABLET ORAL
Qty: 90 TABLET | Refills: 0 | Status: SHIPPED | OUTPATIENT
Start: 2020-05-05 | End: 2020-08-11

## 2020-05-05 NOTE — TELEPHONE ENCOUNTER
Called patient . States symptoms of nausea, gi discomfort have subsided, her appetite is now back. Still co gas/bloating , but improving. Will not take ozempic anymore. Will continue on Invokana 300mg day, glimepiride 4mg  And metformin 500 mg bid.  [employer]

## 2020-05-12 ENCOUNTER — TELEPHONE (OUTPATIENT)
Dept: FAMILY MEDICINE CLINIC | Facility: CLINIC | Age: 55
End: 2020-05-12

## 2020-05-12 NOTE — TELEPHONE ENCOUNTER
----- Message from Calvin Onofre DO sent at 5/12/2020  5:02 PM CDT -----  Please contact patient she is to schedule fu mammogram.

## 2020-05-18 ENCOUNTER — TELEPHONE (OUTPATIENT)
Dept: ENDOCRINOLOGY CLINIC | Facility: CLINIC | Age: 55
End: 2020-05-18

## 2020-05-18 NOTE — TELEPHONE ENCOUNTER
Patient called to help schedule f/u apt. Family member answered stating that patient is not home. LMTCB with family member.        LOV: 1/8/20 with Dr. Carl Donald, rtc in 4 months   A1C: 8.0% 1/8/20    Thank you

## 2020-07-06 RX ORDER — PERPHENAZINE 16 MG/1
TABLET, FILM COATED ORAL
Qty: 200 STRIP | Refills: 11 | Status: SHIPPED | OUTPATIENT
Start: 2020-07-06 | End: 2022-05-06

## 2020-08-11 RX ORDER — GLIMEPIRIDE 4 MG/1
TABLET ORAL
Qty: 90 TABLET | Refills: 0 | Status: SHIPPED | OUTPATIENT
Start: 2020-08-11 | End: 2020-11-09

## 2020-08-26 ENCOUNTER — OFFICE VISIT (OUTPATIENT)
Dept: ENDOCRINOLOGY CLINIC | Facility: CLINIC | Age: 55
End: 2020-08-26

## 2020-08-26 VITALS
WEIGHT: 148.19 LBS | BODY MASS INDEX: 27 KG/M2 | HEART RATE: 84 BPM | DIASTOLIC BLOOD PRESSURE: 78 MMHG | SYSTOLIC BLOOD PRESSURE: 114 MMHG

## 2020-08-26 DIAGNOSIS — E11.65 UNCONTROLLED TYPE 2 DIABETES MELLITUS WITH HYPERGLYCEMIA (HCC): Primary | ICD-10-CM

## 2020-08-26 LAB
CARTRIDGE LOT#: ABNORMAL NUMERIC
GLUCOSE BLOOD: 173
HEMOGLOBIN A1C: 7.7 % (ref 4.3–5.6)
TEST STRIP LOT #: NORMAL NUMERIC

## 2020-08-26 PROCEDURE — 99213 OFFICE O/P EST LOW 20 MIN: CPT | Performed by: NURSE PRACTITIONER

## 2020-08-26 PROCEDURE — 3074F SYST BP LT 130 MM HG: CPT | Performed by: NURSE PRACTITIONER

## 2020-08-26 PROCEDURE — 3078F DIAST BP <80 MM HG: CPT | Performed by: NURSE PRACTITIONER

## 2020-08-26 PROCEDURE — 83036 HEMOGLOBIN GLYCOSYLATED A1C: CPT | Performed by: NURSE PRACTITIONER

## 2020-08-26 PROCEDURE — 36416 COLLJ CAPILLARY BLOOD SPEC: CPT | Performed by: NURSE PRACTITIONER

## 2020-08-26 PROCEDURE — 82962 GLUCOSE BLOOD TEST: CPT | Performed by: NURSE PRACTITIONER

## 2020-08-26 RX ORDER — DOCUSATE SODIUM 100 MG/1
100 CAPSULE, LIQUID FILLED ORAL
Qty: 20 CAPSULE | Refills: 0 | Status: SHIPPED | OUTPATIENT
Start: 2020-08-26 | End: 2021-10-04

## 2020-08-26 NOTE — PROGRESS NOTES
Name: Fransisco Shirley  Date: 8/26/2020    Referring Physician: No ref. provider found    CHIEF COMPLAINT   No chief complaint on file. HISTORY OF PRESENT ILLNESS   Fransisco Shirley is a 54year old female who presents for follow up on diabetes management.  In Lunch 2pm: chicken tenders or fries -usually smaller meal  Snack: on rare occasion   dinner around 7pm- eat snack: fruit or yogurt     Pastries low in sugar- small piece  - 3x weekly   Denies drinking regular soda or fruit juice or Gatorade or energy dri 20 MG Oral Tab, TAKE 2 TABLETS BY MOUTH NIGHTLY, Disp: 180 tablet, Rfl: 0  •  Semaglutide, 1 MG/DOSE, (OZEMPIC, 1 MG/DOSE,) 2 MG/1.5ML Subcutaneous Solution Pen-injector, Inject 1 mg into the skin once a week., Disp: 3 mL, Rfl: 5  •  Fluticasone Propionate Substance and Sexual Activity      Alcohol use: Yes        Frequency: 2-4 times a month        Comment: per NG:  Occasionally       Drug use: No    Other Topics      Concerns:        Caffeine Concern: Yes          2cups coffee, [id number]    PAST MEDICAL HIS post prandial <180).    1.Type 2 Diabetes Mellitus, uncontrolled, with hyperglycemia   -LAB DATA  HbA,C: 7.7% today --> improved from 8.0% on 1/2020  a) Medications  -continue with Metformin 1,000mg twice daily   -continue with Glimepiride 4mg once daily Patient verbalizes understanding of these issues and agrees to the plan.     8/26/2020  SALLY Castaneda

## 2020-08-26 NOTE — PATIENT INSTRUCTIONS
Your A1C: 7.7% today--> improved from 8.0% on 1/2020  This is too high for you and we will work together on lowering your blood sugars to help improve your health  The main goal of diabetes treatment is to keep your sugar from going too high.  We measure yo low. You can’t always go by symptoms. If in doubt, treat your low blood glucose anyway. 2. Take 15 grams of carbohydrate (carb). Here are some choices:  4 oz. regular fruit juice  3-4 glucose tablets  6 oz. regular soda   7-8 jelly beans  3.  Recheck blood

## 2020-09-04 NOTE — TELEPHONE ENCOUNTER
Canagliflozin (INVOKANA) 300 MG Oral Tab, Take 1 tablet by mouth once daily. , Disp: 90 tablet, Rfl: 1    Refill

## 2020-09-29 ENCOUNTER — TELEPHONE (OUTPATIENT)
Dept: ENDOCRINOLOGY CLINIC | Facility: CLINIC | Age: 55
End: 2020-09-29

## 2020-09-29 DIAGNOSIS — E11.65 TYPE 2 DIABETES MELLITUS WITH HYPERGLYCEMIA, WITHOUT LONG-TERM CURRENT USE OF INSULIN (HCC): Primary | ICD-10-CM

## 2020-09-29 NOTE — TELEPHONE ENCOUNTER
I was under the impression that he was in network, considering that he also has an office that the [address] [place] [address] location. Patient preferred to see someone closer to [address] [place] [address].      Would you happen to know if there is an ophthalmologist that is in net

## 2020-09-29 NOTE — TELEPHONE ENCOUNTER
Dr Francis Cabral is out of network, is there a specific reason patient seeing this provider? Can patient see one of our [place] providers?     [facility]

## 2020-10-12 ENCOUNTER — TELEPHONE (OUTPATIENT)
Dept: ENDOCRINOLOGY CLINIC | Facility: CLINIC | Age: 55
End: 2020-10-12

## 2020-10-12 RX ORDER — LANCETS
EACH MISCELLANEOUS
Qty: 100 EACH | Refills: 5 | Status: SHIPPED | OUTPATIENT
Start: 2020-10-12 | End: 2022-01-03

## 2020-10-12 NOTE — TELEPHONE ENCOUNTER
Current Outpatient Medications:     •  MICROLET LANCETS Does not apply Misc, TEST AS DIRECTED TWICE DAILY, Disp: 100 each, Rfl: 5

## 2020-11-09 RX ORDER — GLIMEPIRIDE 4 MG/1
TABLET ORAL
Qty: 90 TABLET | Refills: 0 | Status: SHIPPED | OUTPATIENT
Start: 2020-11-09 | End: 2021-02-01

## 2020-11-10 ENCOUNTER — OFFICE VISIT (OUTPATIENT)
Dept: FAMILY MEDICINE CLINIC | Facility: CLINIC | Age: 55
End: 2020-11-10

## 2020-11-10 VITALS
BODY MASS INDEX: 27.12 KG/M2 | DIASTOLIC BLOOD PRESSURE: 74 MMHG | HEART RATE: 105 BPM | TEMPERATURE: 98 F | WEIGHT: 147.38 LBS | SYSTOLIC BLOOD PRESSURE: 121 MMHG | HEIGHT: 62 IN

## 2020-11-10 DIAGNOSIS — Z12.11 ENCOUNTER FOR SCREENING COLONOSCOPY: ICD-10-CM

## 2020-11-10 DIAGNOSIS — Z00.00 ROUTINE PHYSICAL EXAMINATION: Primary | ICD-10-CM

## 2020-11-10 DIAGNOSIS — E78.5 HYPERLIPIDEMIA, UNSPECIFIED HYPERLIPIDEMIA TYPE: ICD-10-CM

## 2020-11-10 DIAGNOSIS — M25.511 CHRONIC RIGHT SHOULDER PAIN: ICD-10-CM

## 2020-11-10 DIAGNOSIS — G89.29 CHRONIC RIGHT SHOULDER PAIN: ICD-10-CM

## 2020-11-10 DIAGNOSIS — E66.9 DIABETES MELLITUS TYPE 2 IN OBESE (HCC): ICD-10-CM

## 2020-11-10 DIAGNOSIS — Z01.419 ENCOUNTER FOR ANNUAL ROUTINE GYNECOLOGICAL EXAMINATION: ICD-10-CM

## 2020-11-10 DIAGNOSIS — E11.69 DIABETES MELLITUS TYPE 2 IN OBESE (HCC): ICD-10-CM

## 2020-11-10 PROCEDURE — 3078F DIAST BP <80 MM HG: CPT | Performed by: FAMILY MEDICINE

## 2020-11-10 PROCEDURE — 3008F BODY MASS INDEX DOCD: CPT | Performed by: FAMILY MEDICINE

## 2020-11-10 PROCEDURE — 3074F SYST BP LT 130 MM HG: CPT | Performed by: FAMILY MEDICINE

## 2020-11-10 PROCEDURE — 99396 PREV VISIT EST AGE 40-64: CPT | Performed by: FAMILY MEDICINE

## 2020-11-10 PROCEDURE — 90471 IMMUNIZATION ADMIN: CPT | Performed by: FAMILY MEDICINE

## 2020-11-10 PROCEDURE — 90732 PPSV23 VACC 2 YRS+ SUBQ/IM: CPT | Performed by: FAMILY MEDICINE

## 2020-11-10 RX ORDER — LISINOPRIL 2.5 MG/1
2.5 TABLET ORAL DAILY
Qty: 30 TABLET | Refills: 3 | Status: SHIPPED | OUTPATIENT
Start: 2020-11-10 | End: 2021-03-15

## 2020-11-10 NOTE — PROGRESS NOTES
REASON FOR VISIT:    Fransisco Shirley is a 54year old female who presents for an [address]. Following up for diabetes continues to smoke cigarettes.     Patient Active Problem List:     Type 2 diabetes mellitus without complication, without Screen If high risk No components found for: [place] [address] Internal Lab or Procedure     Annual Monitoring of Persistent Medications  (ACE/ARB, Digoxin, Diuretics)        Potassium  Annually Potassium (mmol/L)   Date Value needed for constipation.  (Patient not taking: Reported on 11/10/2020 ) 20 capsule 0   • Fluticasone Propionate 50 MCG/ACT Nasal Suspension SHAKE LQ AND U 1 SPR NASALLY BID  3   • Mometasone Furoate (NASONEX) 50 MCG/ACT Nasal Suspension 1 spray by Nasal rou day     Alcohol use: Yes      Frequency: 2-4 times a month      Comment: per NG:  Occasionally     Drug use: No    Occ:  :       REVIEW OF SYSTEMS:   GENERAL: feels well otherwise  SKIN: denies any unusual skin lesions  EYES: denies blurred vision o INTERNAL    Chronic right shoulder pain  -     ORTHOPEDIC - INTERNAL    Encounter for annual routine gynecological examination  -     OBG - INTERNAL  -     GASTRO - INTERNAL    Encounter for screening colonoscopy  -     OBG - INTERNAL  -     GASTRO - INTER Future    6.  Hyperlipidemia, unspecified hyperlipidemia type  Continue statin medication       Mammogram order printed out given to patient    Compliance encouraged

## 2020-11-17 ENCOUNTER — LAB ENCOUNTER (OUTPATIENT)
Dept: LAB | Age: 55
End: 2020-11-17
Attending: FAMILY MEDICINE
Payer: COMMERCIAL

## 2020-11-17 DIAGNOSIS — E11.69 DIABETES MELLITUS TYPE 2 IN OBESE (HCC): ICD-10-CM

## 2020-11-17 DIAGNOSIS — E66.9 DIABETES MELLITUS TYPE 2 IN OBESE (HCC): ICD-10-CM

## 2020-11-17 PROCEDURE — 80053 COMPREHEN METABOLIC PANEL: CPT

## 2020-11-17 PROCEDURE — 80061 LIPID PANEL: CPT

## 2020-11-17 PROCEDURE — 82570 ASSAY OF URINE CREATININE: CPT

## 2020-11-17 PROCEDURE — 82043 UR ALBUMIN QUANTITATIVE: CPT

## 2020-11-17 PROCEDURE — 36415 COLL VENOUS BLD VENIPUNCTURE: CPT

## 2020-12-02 ENCOUNTER — OFFICE VISIT (OUTPATIENT)
Dept: ENDOCRINOLOGY CLINIC | Facility: CLINIC | Age: 55
End: 2020-12-02

## 2020-12-02 VITALS
BODY MASS INDEX: 27 KG/M2 | WEIGHT: 149.19 LBS | HEART RATE: 94 BPM | DIASTOLIC BLOOD PRESSURE: 74 MMHG | SYSTOLIC BLOOD PRESSURE: 112 MMHG

## 2020-12-02 DIAGNOSIS — E11.65 TYPE 2 DIABETES MELLITUS WITH HYPERGLYCEMIA, WITHOUT LONG-TERM CURRENT USE OF INSULIN (HCC): Primary | ICD-10-CM

## 2020-12-02 PROCEDURE — 36416 COLLJ CAPILLARY BLOOD SPEC: CPT | Performed by: NURSE PRACTITIONER

## 2020-12-02 PROCEDURE — 99213 OFFICE O/P EST LOW 20 MIN: CPT | Performed by: NURSE PRACTITIONER

## 2020-12-02 PROCEDURE — 3074F SYST BP LT 130 MM HG: CPT | Performed by: NURSE PRACTITIONER

## 2020-12-02 PROCEDURE — 82947 ASSAY GLUCOSE BLOOD QUANT: CPT | Performed by: NURSE PRACTITIONER

## 2020-12-02 PROCEDURE — 3078F DIAST BP <80 MM HG: CPT | Performed by: NURSE PRACTITIONER

## 2020-12-02 PROCEDURE — 83036 HEMOGLOBIN GLYCOSYLATED A1C: CPT | Performed by: NURSE PRACTITIONER

## 2020-12-02 NOTE — PATIENT INSTRUCTIONS
A1C: 7.7% today --> stable from 7.7% on 8/26/2020  Blood glucose: 263 in clinic today         Medications:    Metformin 1,000mg twice daily   Glimepiride 4mg once daily   Invokana 300mg once daily     Blood sugar testing:   Test your blood sugar 1 times da

## 2020-12-02 NOTE — PROGRESS NOTES
Name: Karey Aguilera  Date: 12/2/2020    CHIEF COMPLAINT   No chief complaint on file. HISTORY OF PRESENT ILLNESS   Karey Aguilera is a 54year old female who presents for follow up on diabetes management.  In the past 3 months DM has been fairly control or infections: no    REVIEW OF SYSTEMS  Constitutional: Negative for: weight change, fever, fatigue, cold/heat intolerance  Eyes: Negative for:  Visual changes, proptosis, blurring  ENT: Negative for:  dysphagia, neck swelling, dysphonia  Respiratory: Nega Pen-injector, Inject 1 mg into the skin once a week., Disp: 3 mL, Rfl: 5  •  Fluticasone Propionate 50 MCG/ACT Nasal Suspension, SHAKE LQ AND U 1 SPR NASALLY BID, Disp: , Rfl: 3  •  Mometasone Furoate (NASONEX) 50 MCG/ACT Nasal Suspension, 1 spray by Nasal coffee, [id number]    PAST MEDICAL HISTORY:   Past Medical History:   Diagnosis Date   • High cholesterol    • Hyperlipidemia    • Type II or unspecified type diabetes mellitus without mention of complication, not stated as uncontrolled      PAST SURGICAL HI avoid 1 whole bagel and instead to eat no more than 1 cup of oatmeal or 1 slice of bread with avocado.   -discussed to continue walking during lunch break for 30 mins.      b) Nephropathy: GFR: 91 on 10/2019  and urine MA: 5.3 on 12/2018  c) Reviewed import

## 2021-02-01 RX ORDER — GLIMEPIRIDE 4 MG/1
TABLET ORAL
Qty: 90 TABLET | Refills: 0 | Status: SHIPPED | OUTPATIENT
Start: 2021-02-01 | End: 2021-05-03

## 2021-02-23 NOTE — TELEPHONE ENCOUNTER
Called patient back informed her below, patient already seen dr Danica Durham, verbally understood, closing encounter    [facility]- managed care

## 2021-03-08 RX ORDER — CANAGLIFLOZIN 300 MG/1
TABLET, FILM COATED ORAL
Qty: 90 TABLET | Refills: 1 | Status: SHIPPED | OUTPATIENT
Start: 2021-03-08 | End: 2021-09-20

## 2021-03-10 RX ORDER — ATORVASTATIN CALCIUM 20 MG/1
40 TABLET, FILM COATED ORAL NIGHTLY
Qty: 180 TABLET | Refills: 0 | Status: SHIPPED | OUTPATIENT
Start: 2021-03-10 | End: 2021-10-04

## 2021-03-15 RX ORDER — LISINOPRIL 2.5 MG/1
2.5 TABLET ORAL DAILY
Qty: 30 TABLET | Refills: 3 | Status: SHIPPED | OUTPATIENT
Start: 2021-03-15 | End: 2021-09-26

## 2021-03-15 NOTE — TELEPHONE ENCOUNTER
Per pharmacy, pt requesting a 90 day supply for     •  lisinopril 2.5 MG Oral Tab, Take 1 tablet (2.5 mg total) by mouth daily.  For high blood pressure or kidney protection, Disp: 30 tablet, Rfl: 3

## 2021-03-24 ENCOUNTER — OFFICE VISIT (OUTPATIENT)
Dept: ENDOCRINOLOGY CLINIC | Facility: CLINIC | Age: 56
End: 2021-03-24

## 2021-03-24 VITALS
HEART RATE: 97 BPM | DIASTOLIC BLOOD PRESSURE: 74 MMHG | SYSTOLIC BLOOD PRESSURE: 116 MMHG | BODY MASS INDEX: 27 KG/M2 | WEIGHT: 147.81 LBS

## 2021-03-24 DIAGNOSIS — E11.65 TYPE 2 DIABETES MELLITUS WITH HYPERGLYCEMIA, WITHOUT LONG-TERM CURRENT USE OF INSULIN (HCC): Primary | ICD-10-CM

## 2021-03-24 LAB
CARTRIDGE LOT#: ABNORMAL NUMERIC
GLUCOSE BLOOD: 167
HEMOGLOBIN A1C: 7.5 % (ref 4.3–5.6)
TEST STRIP LOT #: NORMAL NUMERIC

## 2021-03-24 PROCEDURE — 3074F SYST BP LT 130 MM HG: CPT | Performed by: NURSE PRACTITIONER

## 2021-03-24 PROCEDURE — 82947 ASSAY GLUCOSE BLOOD QUANT: CPT | Performed by: NURSE PRACTITIONER

## 2021-03-24 PROCEDURE — 83036 HEMOGLOBIN GLYCOSYLATED A1C: CPT | Performed by: NURSE PRACTITIONER

## 2021-03-24 PROCEDURE — 99214 OFFICE O/P EST MOD 30 MIN: CPT | Performed by: NURSE PRACTITIONER

## 2021-03-24 PROCEDURE — 36416 COLLJ CAPILLARY BLOOD SPEC: CPT | Performed by: NURSE PRACTITIONER

## 2021-03-24 PROCEDURE — 3078F DIAST BP <80 MM HG: CPT | Performed by: NURSE PRACTITIONER

## 2021-03-24 NOTE — PATIENT INSTRUCTIONS
A1C: 7.5% today -->improved from 7.7% on 12/2/2020  Blood glucose: 167 in clinic today       Medications:     -continue with Metformin 1,000mg with breakfast      1,000mg with dinner   -continue with Glimepiride 4mg once daily in AM  -continue with Invokan untreated, the fungus may spread to the nail bed, which is the skin under the nail. The nail may  also fall off. How is tinea unguium treated? Tinea unguium is diagnosed by looking at nail clippings under a microscope.  Medical treatment is not needed for instructions.

## 2021-03-24 NOTE — PROGRESS NOTES
Name: Steffanie Mcgee  Date: 3/24/2021    CHIEF COMPLAINT   No chief complaint on file. HISTORY OF PRESENT ILLNESS   Steffanie Mcgee is a 54year old female who presents for follow up on diabetes management.    In the past 3 months DM has been well control no  Paresthesias: no  Blurred vision: no  Recent steroids, illness or infections: no    REVIEW OF SYSTEMS  Constitutional: Negative for: weight change, fever, fatigue, cold/heat intolerance  Eyes: Negative for:  Visual changes, proptosis, blurring  ENT: Ne MG/DOSE, (OZEMPIC, 1 MG/DOSE,) 2 MG/1.5ML Subcutaneous Solution Pen-injector, Inject 1 mg into the skin once a week., Disp: 3 mL, Rfl: 5  •  Fluticasone Propionate 50 MCG/ACT Nasal Suspension, SHAKE LQ AND U 1 SPR NASALLY BID, Disp: , Rfl: 3  •  Mometasone Topics      Concerns:        Caffeine Concern: Yes          2cups coffee, [id number]    Social Determinants of Health  Financial Resource Strain:       Difficulty of Paying Living Expenses:   Food Insecurity:       Worried About Running Out of Food in the [facility] moisture and skin texture  Neuro:  sensory grossly intact and motor grossly intact  Psychiatric:  oriented to time, self, and place    DIABETIC FOOT EXAM:  Skin to bilateral feet- intact without any skin discoloration.   Bilateral 2+ pedal pulse pedal pulse and/or if they have readings persistently >200. The risks and benefits of my recommendations. Questions were also answered to the best of my knowledge. Patient verbalizes understanding of these issues and agrees to the plan.     3/24/2021  Capo Erickson

## 2021-03-30 NOTE — TELEPHONE ENCOUNTER
•  metFORMIN HCl 500 MG Oral Tab, TAKE 2 TABLETS BY MOUTH TWICE DAILY WITH THE MORNING AND EVENING MEAL, Disp: 360 tablet, Rfl: 0

## 2021-05-03 RX ORDER — GLIMEPIRIDE 4 MG/1
TABLET ORAL
Qty: 90 TABLET | Refills: 0 | Status: SHIPPED | OUTPATIENT
Start: 2021-05-03 | End: 2021-08-02

## 2021-06-05 ENCOUNTER — TELEPHONE (OUTPATIENT)
Dept: FAMILY MEDICINE CLINIC | Facility: CLINIC | Age: 56
End: 2021-06-05

## 2021-06-15 ENCOUNTER — OFFICE VISIT (OUTPATIENT)
Dept: FAMILY MEDICINE CLINIC | Facility: CLINIC | Age: 56
End: 2021-06-15

## 2021-06-15 VITALS
DIASTOLIC BLOOD PRESSURE: 65 MMHG | HEART RATE: 101 BPM | BODY MASS INDEX: 29.44 KG/M2 | HEIGHT: 62 IN | SYSTOLIC BLOOD PRESSURE: 120 MMHG | WEIGHT: 160 LBS

## 2021-06-15 DIAGNOSIS — Z01.419 ENCOUNTER FOR ROUTINE GYNECOLOGICAL EXAMINATION WITH PAPANICOLAOU SMEAR OF CERVIX: Primary | ICD-10-CM

## 2021-06-15 DIAGNOSIS — Z12.31 ENCOUNTER FOR SCREENING MAMMOGRAM FOR BREAST CANCER: ICD-10-CM

## 2021-06-15 DIAGNOSIS — Z12.11 SCREENING FOR MALIGNANT NEOPLASM OF COLON: ICD-10-CM

## 2021-06-15 PROCEDURE — 99396 PREV VISIT EST AGE 40-64: CPT | Performed by: PHYSICIAN ASSISTANT

## 2021-06-15 PROCEDURE — 90471 IMMUNIZATION ADMIN: CPT | Performed by: PHYSICIAN ASSISTANT

## 2021-06-15 PROCEDURE — 3074F SYST BP LT 130 MM HG: CPT | Performed by: PHYSICIAN ASSISTANT

## 2021-06-15 PROCEDURE — 3008F BODY MASS INDEX DOCD: CPT | Performed by: PHYSICIAN ASSISTANT

## 2021-06-15 PROCEDURE — 3078F DIAST BP <80 MM HG: CPT | Performed by: PHYSICIAN ASSISTANT

## 2021-06-15 PROCEDURE — 90750 HZV VACC RECOMBINANT IM: CPT | Performed by: PHYSICIAN ASSISTANT

## 2021-06-16 NOTE — PROGRESS NOTES
HPI:     HPI  64year-old female is here in the office for gyne physical. Patient is doing fine at this time. Patient denies of chest pain, SOB, N/V/C/D, fever, dizziness, syncope, abdominal pain. There are no other concerns today.     Medications:     Curr 2   • ibuprofen 600 MG Oral Tab Take 600 mg by mouth every 8 (eight) hours as needed. (Patient not taking: Reported on 6/15/2021 )  0   • Montelukast Sodium 10 MG Oral Tab Take 1 tablet (10 mg total) by mouth nightly.  (Patient not taking: Reported on 11/ Marital status: Single      Spouse name: Not on file      Number of children: Not on file      Years of education: Not on file      Highest education level: Not on file    Occupational History      Not on file    Tobacco Use      Smoking status: Current Ev Physically Abused:       Sexually Abused:     Review of Systems:   Review of Systems   Constitutional: Negative. Negative for activity change, chills, fatigue and fever. HENT: Negative.   Negative for congestion, ear discharge, ear pain, postnasal drip, Genitourinary:     Labia:         Right: No rash, tenderness or lesion. Left: No rash, tenderness or lesion. Vagina: Normal.      Cervix: No cervical motion tenderness or discharge.    Musculoskeletal:         General: Normal range of motion

## 2021-07-28 ENCOUNTER — OFFICE VISIT (OUTPATIENT)
Dept: ENDOCRINOLOGY CLINIC | Facility: CLINIC | Age: 56
End: 2021-07-28

## 2021-07-28 VITALS
SYSTOLIC BLOOD PRESSURE: 108 MMHG | WEIGHT: 145.63 LBS | HEART RATE: 94 BPM | DIASTOLIC BLOOD PRESSURE: 70 MMHG | BODY MASS INDEX: 27 KG/M2

## 2021-07-28 DIAGNOSIS — E11.65 TYPE 2 DIABETES MELLITUS WITH HYPERGLYCEMIA, WITHOUT LONG-TERM CURRENT USE OF INSULIN (HCC): Primary | ICD-10-CM

## 2021-07-28 LAB
CARTRIDGE LOT#: ABNORMAL NUMERIC
GLUCOSE BLOOD: 115
HEMOGLOBIN A1C: 7.3 % (ref 4.3–5.6)
TEST STRIP LOT #: NORMAL NUMERIC

## 2021-07-28 PROCEDURE — 36416 COLLJ CAPILLARY BLOOD SPEC: CPT | Performed by: NURSE PRACTITIONER

## 2021-07-28 PROCEDURE — 3078F DIAST BP <80 MM HG: CPT | Performed by: NURSE PRACTITIONER

## 2021-07-28 PROCEDURE — 3074F SYST BP LT 130 MM HG: CPT | Performed by: NURSE PRACTITIONER

## 2021-07-28 PROCEDURE — 83036 HEMOGLOBIN GLYCOSYLATED A1C: CPT | Performed by: NURSE PRACTITIONER

## 2021-07-28 PROCEDURE — 3051F HG A1C>EQUAL 7.0%<8.0%: CPT | Performed by: NURSE PRACTITIONER

## 2021-07-28 PROCEDURE — 99214 OFFICE O/P EST MOD 30 MIN: CPT | Performed by: NURSE PRACTITIONER

## 2021-07-28 PROCEDURE — 82947 ASSAY GLUCOSE BLOOD QUANT: CPT | Performed by: NURSE PRACTITIONER

## 2021-07-28 NOTE — PROGRESS NOTES
Name: Carlton Weeks  Date: 7/28/2021    CHIEF COMPLAINT   No chief complaint on file. HISTORY OF PRESENT ILLNESS   Carlton Weeks is a 64year old female who presents for follow up on diabetes management.    In the past 4 months DM has been well control polydipsia: no  Paresthesias: no  Blurred vision: no  Recent steroids, illness or infections: no    REVIEW OF SYSTEMS  Constitutional: Negative for: weight change, fever, fatigue, cold/heat intolerance  Eyes: Negative for:  Visual changes, proptosis, blurr capsule (100 mg total) by mouth daily as needed for constipation.  (Patient not taking: Reported on 11/10/2020 ), Disp: 20 capsule, Rfl: 0  •  CONTOUR NEXT TEST In Vitro Strip, TEST TWICE DAILY, Disp: 200 strip, Rfl: 11  •  Semaglutide, 1 MG/DOSE, (OZEMPIC, education: Not on file      Highest education level: Not on file    Tobacco Use      Smoking status: Current Every Day Smoker        Years: 30.00      Smokeless tobacco: Never Used      Tobacco comment: 3 cigs a day     Vaping Use      Vaping Use: Never us ketoacidosis. Patient verbalizes understanding of the importance of glycemic control and the goals of therapy.   -Discussed with patient glucose targets ranges (Fasting  and post prandial <180).    1.Type 2 Diabetes Mellitus, uncontrolled  -LAB DATA

## 2021-07-28 NOTE — PATIENT INSTRUCTIONS
A1C: 7.3% today -->improved from 7.5% on 3/24/2021  Blood glucose: 115 in clinic today    Medications:   -continue with Metformin 1,000mg twice daily  -continue with Glimepiride 4mg once daily   -continue with Invokana 300mg once daily    Blood sugar testi

## 2021-08-02 RX ORDER — GLIMEPIRIDE 4 MG/1
TABLET ORAL
Qty: 90 TABLET | Refills: 0 | Status: SHIPPED | OUTPATIENT
Start: 2021-08-02 | End: 2021-11-01

## 2021-08-16 ENCOUNTER — NURSE ONLY (OUTPATIENT)
Dept: FAMILY MEDICINE CLINIC | Facility: CLINIC | Age: 56
End: 2021-08-16

## 2021-08-16 DIAGNOSIS — Z23 NEED FOR VACCINATION: Primary | ICD-10-CM

## 2021-08-16 PROCEDURE — 90750 HZV VACC RECOMBINANT IM: CPT | Performed by: FAMILY MEDICINE

## 2021-08-16 PROCEDURE — 90471 IMMUNIZATION ADMIN: CPT | Performed by: FAMILY MEDICINE

## 2021-08-16 NOTE — PROGRESS NOTES
Pt presents for second shingrix vaccine, pt name and  was verified.  Vaccine was administered in Left deltoid, pt had no other questions or concerns

## 2021-08-23 ENCOUNTER — TELEPHONE (OUTPATIENT)
Dept: CASE MANAGEMENT | Age: 56
End: 2021-08-23

## 2021-08-23 NOTE — TELEPHONE ENCOUNTER
Patient is due for colorectal screening. FIT ordered 6/15/21. Left message to call back [phone].

## 2021-09-20 RX ORDER — CANAGLIFLOZIN 300 MG/1
TABLET, FILM COATED ORAL
Qty: 90 TABLET | Refills: 1 | Status: SHIPPED | OUTPATIENT
Start: 2021-09-20

## 2021-09-26 RX ORDER — LISINOPRIL 2.5 MG/1
2.5 TABLET ORAL DAILY
Qty: 90 TABLET | Refills: 1 | Status: SHIPPED | OUTPATIENT
Start: 2021-09-26 | End: 2022-12-01

## 2021-09-26 NOTE — TELEPHONE ENCOUNTER
Refill passed per [employer] protocol.     Requested Prescriptions   Pending Prescriptions Disp Refills    LISINOPRIL 2.5 MG Oral Tab [Pharmacy Med Name: LISINOPRIL 2.5MG TABLETS] 30 tablet 3     Sig: TAKE 1 TABLET(2.5 MG) BY MOUTH DAILY FOR HIGH BLOOD PRESSURE OR KIDNEY PROTECTION        Hypertensive Medications Protocol Passed - 9/26/2021 11:28 AM        Passed - CMP or BMP in past 12 months        Passed - Appointment in past 6 or next 3 months        Passed - GFR Non- > 50     Lab Results   Component Value Date    GFRNAA 87 11/17/2020                       Recent Outpatient Visits              1 month ago Need for vaccination    [address] [facility]    Nurse Only    2 months ago Type 2 diabetes mellitus with hyperglycemia, without long-term current use of insulin [facility])    [place] [facility], [employer] [address], [facility]    Office Visit    3 months ago Encounter for routine gynecological examination with Papanicolaou smear of cervix    [address] Kendal Cuarda PA-C    Office Visit    6 months ago Type 2 diabetes mellitus with hyperglycemia, without long-term current use of insulin [facility])    [place] [facility] [address] [facility]    Office Visit    9 months ago Type 2 diabetes mellitus with hyperglycemia, without long-term current use of insulin [facility])    [place] [facility], [facility] [address] [facility], [facility] [address]    Office Visit            Future Appointments         Provider Department Appt Notes    In 4 months Breanne Sosa, [address] month f/u
Yes

## 2021-10-04 ENCOUNTER — OFFICE VISIT (OUTPATIENT)
Dept: FAMILY MEDICINE CLINIC | Facility: CLINIC | Age: 56
End: 2021-10-04

## 2021-10-04 VITALS
HEART RATE: 86 BPM | WEIGHT: 153 LBS | BODY MASS INDEX: 28.16 KG/M2 | DIASTOLIC BLOOD PRESSURE: 76 MMHG | SYSTOLIC BLOOD PRESSURE: 134 MMHG | HEIGHT: 62 IN

## 2021-10-04 DIAGNOSIS — M75.100 NONTRAUMATIC TEAR OF ROTATOR CUFF, UNSPECIFIED LATERALITY, UNSPECIFIED TEAR EXTENT: ICD-10-CM

## 2021-10-04 DIAGNOSIS — E11.9 TYPE 2 DIABETES MELLITUS WITHOUT COMPLICATION, WITHOUT LONG-TERM CURRENT USE OF INSULIN (HCC): Primary | ICD-10-CM

## 2021-10-04 PROCEDURE — 99214 OFFICE O/P EST MOD 30 MIN: CPT | Performed by: FAMILY MEDICINE

## 2021-10-04 PROCEDURE — 3008F BODY MASS INDEX DOCD: CPT | Performed by: FAMILY MEDICINE

## 2021-10-04 PROCEDURE — 3075F SYST BP GE 130 - 139MM HG: CPT | Performed by: FAMILY MEDICINE

## 2021-10-04 PROCEDURE — 90686 IIV4 VACC NO PRSV 0.5 ML IM: CPT | Performed by: FAMILY MEDICINE

## 2021-10-04 PROCEDURE — 3078F DIAST BP <80 MM HG: CPT | Performed by: FAMILY MEDICINE

## 2021-10-04 PROCEDURE — 90471 IMMUNIZATION ADMIN: CPT | Performed by: FAMILY MEDICINE

## 2021-10-04 NOTE — PROGRESS NOTES
Blood pressure 134/76, pulse 86, height 5' 2\" (1.575 m), weight 153 lb (69.4 kg). Patient presents today following up for type 2 diabetes and hyperlipidemia. She denies chest pain or dyspnea she continues to smoke.   Continues to have right shoulder pa

## 2021-10-06 ENCOUNTER — APPOINTMENT (OUTPATIENT)
Dept: LAB | Facility: HOSPITAL | Age: 56
End: 2021-10-06
Attending: FAMILY MEDICINE
Payer: COMMERCIAL

## 2021-10-06 PROCEDURE — 82274 ASSAY TEST FOR BLOOD FECAL: CPT

## 2021-10-08 ENCOUNTER — LAB ENCOUNTER (OUTPATIENT)
Dept: LAB | Age: 56
End: 2021-10-08
Attending: FAMILY MEDICINE
Payer: COMMERCIAL

## 2021-10-08 DIAGNOSIS — Z12.11 SCREENING FOR MALIGNANT NEOPLASM OF COLON: ICD-10-CM

## 2021-10-08 DIAGNOSIS — E03.9 HYPOTHYROIDISM, UNSPECIFIED TYPE: Primary | ICD-10-CM

## 2021-10-08 DIAGNOSIS — E11.9 TYPE 2 DIABETES MELLITUS WITHOUT COMPLICATION, WITHOUT LONG-TERM CURRENT USE OF INSULIN (HCC): ICD-10-CM

## 2021-10-08 PROCEDURE — 36415 COLL VENOUS BLD VENIPUNCTURE: CPT

## 2021-10-08 PROCEDURE — 82570 ASSAY OF URINE CREATININE: CPT

## 2021-10-08 PROCEDURE — 84439 ASSAY OF FREE THYROXINE: CPT

## 2021-10-08 PROCEDURE — 84443 ASSAY THYROID STIM HORMONE: CPT

## 2021-10-08 PROCEDURE — 82043 UR ALBUMIN QUANTITATIVE: CPT

## 2021-10-08 PROCEDURE — 80053 COMPREHEN METABOLIC PANEL: CPT

## 2021-10-08 PROCEDURE — 80061 LIPID PANEL: CPT

## 2021-10-09 ENCOUNTER — TELEPHONE (OUTPATIENT)
Dept: FAMILY MEDICINE CLINIC | Facility: CLINIC | Age: 56
End: 2021-10-09

## 2021-10-09 NOTE — TELEPHONE ENCOUNTER
----- Message From: Juana Doan DO  Sent: 10/8/2021 5:42 PM CDT----    Thyroid test abnormal patient follow-up in 6 weeks for repeat testing nonfasting.

## 2021-10-11 NOTE — TELEPHONE ENCOUNTER
2nd attempt, patient's friend answered (not on CARMEL). informed patient's friend to have patient to return call.

## 2021-10-12 NOTE — TELEPHONE ENCOUNTER
Patient calling back, with Aramis Silva #[id number], advised DR Cliff Cartagena note and stated understanding. Informed that tests are non fasting and orders are in the system.

## 2021-11-01 ENCOUNTER — HOSPITAL ENCOUNTER (OUTPATIENT)
Dept: GENERAL RADIOLOGY | Facility: HOSPITAL | Age: 56
Discharge: HOME | End: 2021-11-01
Attending: ORTHOPAEDIC SURGERY
Payer: COMMERCIAL

## 2021-11-01 ENCOUNTER — OFFICE VISIT (OUTPATIENT)
Dept: ORTHOPEDICS CLINIC | Facility: CLINIC | Age: 56
End: 2021-11-01

## 2021-11-01 VITALS — HEIGHT: 62 IN | WEIGHT: 153 LBS | BODY MASS INDEX: 28.16 KG/M2

## 2021-11-01 DIAGNOSIS — R52 PAIN: ICD-10-CM

## 2021-11-01 DIAGNOSIS — M54.12 RIGHT CERVICAL RADICULOPATHY: Primary | ICD-10-CM

## 2021-11-01 DIAGNOSIS — M75.21 BICEPS TENDINITIS OF RIGHT SHOULDER: ICD-10-CM

## 2021-11-01 PROCEDURE — 3008F BODY MASS INDEX DOCD: CPT | Performed by: ORTHOPAEDIC SURGERY

## 2021-11-01 PROCEDURE — 72040 X-RAY EXAM NECK SPINE 2-3 VW: CPT | Performed by: ORTHOPAEDIC SURGERY

## 2021-11-01 PROCEDURE — 99244 OFF/OP CNSLTJ NEW/EST MOD 40: CPT | Performed by: ORTHOPAEDIC SURGERY

## 2021-11-01 RX ORDER — GLIMEPIRIDE 4 MG/1
TABLET ORAL
Qty: 90 TABLET | Refills: 1 | Status: SHIPPED | OUTPATIENT
Start: 2021-11-01

## 2021-11-01 RX ORDER — METHYLPREDNISOLONE 4 MG/1
TABLET ORAL
Qty: 1 EACH | Refills: 0 | Status: SHIPPED | OUTPATIENT
Start: 2021-11-01

## 2021-11-01 NOTE — H&P
NURSING INTAKE COMMENTS: Patient presents with:  Consult: 64year old female is here today c/o Right shoulder pain. Patient denies any recent trauma/injury related to today's pain.  Patient was seen in 2019, MRI was order, she was never provided with result Microlet Lancets Does not apply Misc TEST AS DIRECTED TWICE DAILY 100 each 5   • CONTOUR NEXT TEST In Vitro Strip TEST TWICE DAILY 200 strip 11   • aspirin 81 MG Oral Tab EC Take 81 mg by mouth daily.      • BD PEN NEEDLE SARAH U/F 32G X 4 MM Does not apply thyroid or diabetes issues  ALL/ASTHMA: no new hx of severe allergy or asthma    Physical Examination:    Ht 5' 2\" (1.575 m)   Wt 153 lb (69.4 kg)   Breastfeeding No   BMI 27.98 kg/m²   Constitutional: appears well hydrated, alert and responsive, no acute of right shoulder  -     PHYSICAL THERAPY - INTERNAL  -     methylPREDNISolone 4 MG Oral Tablet Therapy Pack; TAKE AS PER INSTRUCTION SHEET. No Advil on this medication. Assessment: Above diagnoses. Plan: Recommended Medrol Dosepak.   She is steffanie

## 2021-11-01 NOTE — TELEPHONE ENCOUNTER
LOV: 7/8/21    Future Appointments   Date Time Provider Talya Helms   1/26/2022  1:45 PM SALLY Spivey [facility]

## 2021-11-11 ENCOUNTER — HOSPITAL ENCOUNTER (OUTPATIENT)
Dept: CT IMAGING | Age: 56
Discharge: HOME | End: 2021-11-11
Attending: FAMILY MEDICINE

## 2021-11-11 DIAGNOSIS — E11.9 TYPE 2 DIABETES MELLITUS WITHOUT COMPLICATION, WITHOUT LONG-TERM CURRENT USE OF INSULIN (HCC): ICD-10-CM

## 2021-11-24 ENCOUNTER — HOSPITAL ENCOUNTER (OUTPATIENT)
Dept: MAMMOGRAPHY | Age: 56
Discharge: HOME | End: 2021-11-24
Attending: PHYSICIAN ASSISTANT
Payer: COMMERCIAL

## 2021-11-24 DIAGNOSIS — Z12.31 ENCOUNTER FOR SCREENING MAMMOGRAM FOR BREAST CANCER: ICD-10-CM

## 2021-11-24 PROCEDURE — 77067 SCR MAMMO BI INCL CAD: CPT | Performed by: PHYSICIAN ASSISTANT

## 2021-11-24 PROCEDURE — 77063 BREAST TOMOSYNTHESIS BI: CPT | Performed by: PHYSICIAN ASSISTANT

## 2021-11-29 ENCOUNTER — TELEPHONE (OUTPATIENT)
Dept: PHYSICAL THERAPY | Facility: HOSPITAL | Age: 56
End: 2021-11-29

## 2021-11-29 ENCOUNTER — HOSPITAL ENCOUNTER (OUTPATIENT)
Dept: ULTRASOUND IMAGING | Age: 56
Discharge: HOME | End: 2021-11-29
Attending: FAMILY MEDICINE

## 2021-11-29 DIAGNOSIS — Z13.9 ENCOUNTER FOR SCREENING: ICD-10-CM

## 2021-12-06 ENCOUNTER — OFFICE VISIT (OUTPATIENT)
Dept: PHYSICAL THERAPY | Age: 56
End: 2021-12-06
Attending: ORTHOPAEDIC SURGERY
Payer: COMMERCIAL

## 2021-12-06 DIAGNOSIS — M75.21 BICEPS TENDINITIS OF RIGHT SHOULDER: ICD-10-CM

## 2021-12-06 DIAGNOSIS — M54.12 RIGHT CERVICAL RADICULOPATHY: ICD-10-CM

## 2021-12-06 DIAGNOSIS — R52 PAIN: ICD-10-CM

## 2021-12-06 PROCEDURE — 97161 PT EVAL LOW COMPLEX 20 MIN: CPT

## 2021-12-06 PROCEDURE — 97110 THERAPEUTIC EXERCISES: CPT

## 2021-12-06 NOTE — PROGRESS NOTES
P.T. EVALUATION:   Referring Physician: Dr. Gonsalo Munoz  Diagnosis: Pain (R52)  Right cervical radiculopathy (M54.12)  Biceps tendinitis of right shoulder (M75.21)     Date of Onset: 2019 Date of Service: 12/6/2021     PATIENT SUMMARY   Belinda Cueva dinorah a Sindi Flx: WNL  Ext: WNL   Rot: B WNL  Lat flx R WNL, L min loss     Shoulder ROM (supine):  Flx: B 170 deg  Abd: R 120 deg (limited by pain), L 175 deg  ER: R 60 deg (limited by stiffness), L 75 deg  IR: R 90 deg, 90 deg    Accessory motion:   GH joint mobili DPT    [de-identified] certification required: Yes  I certify the need for these services furnished under this plan of treatment and while under my care.     X___________________________________________________ Date____________________    Certification From: 15

## 2021-12-08 ENCOUNTER — TELEPHONE (OUTPATIENT)
Dept: PHYSICAL THERAPY | Facility: HOSPITAL | Age: 56
End: 2021-12-08

## 2021-12-08 NOTE — TELEPHONE ENCOUNTER
Offered pt 5:00 today -declined - Offered Friday @ 9:15- declined     Works and needs the later appts and cannot do today

## 2021-12-09 ENCOUNTER — OFFICE VISIT (OUTPATIENT)
Dept: PHYSICAL THERAPY | Age: 56
End: 2021-12-09
Attending: ORTHOPAEDIC SURGERY
Payer: COMMERCIAL

## 2021-12-09 DIAGNOSIS — R52 PAIN: ICD-10-CM

## 2021-12-09 DIAGNOSIS — M75.21 BICEPS TENDINITIS OF RIGHT SHOULDER: ICD-10-CM

## 2021-12-09 DIAGNOSIS — M54.12 RIGHT CERVICAL RADICULOPATHY: ICD-10-CM

## 2021-12-09 PROCEDURE — 97110 THERAPEUTIC EXERCISES: CPT | Performed by: PHYSICAL THERAPIST

## 2021-12-09 PROCEDURE — 97140 MANUAL THERAPY 1/> REGIONS: CPT | Performed by: PHYSICAL THERAPIST

## 2021-12-09 NOTE — PROGRESS NOTES
Dx: Pain (R52)  Right cervical radiculopathy (M54.12)  Biceps tendinitis of right shoulder (M75.21)            Insurance (Authorized # of Visits):  6           Authorizing Physician: Dr. Jayce Osorio  Next MD visit: none scheduled  Fall Risk: standard Timed Treatment: 46 min  Total Treatment Time: 46 min

## 2021-12-13 ENCOUNTER — OFFICE VISIT (OUTPATIENT)
Dept: PHYSICAL THERAPY | Age: 56
End: 2021-12-13
Attending: ORTHOPAEDIC SURGERY
Payer: COMMERCIAL

## 2021-12-13 DIAGNOSIS — M54.12 RIGHT CERVICAL RADICULOPATHY: ICD-10-CM

## 2021-12-13 DIAGNOSIS — R52 PAIN: ICD-10-CM

## 2021-12-13 DIAGNOSIS — M75.21 BICEPS TENDINITIS OF RIGHT SHOULDER: ICD-10-CM

## 2021-12-13 PROCEDURE — 97110 THERAPEUTIC EXERCISES: CPT

## 2021-12-13 NOTE — PROGRESS NOTES
Dx: Pain (R52)  Right cervical radiculopathy (M54.12)  Biceps tendinitis of right shoulder (M75.21)            Insurance (Authorized # of Visits):  6           Authorizing Physician: Dr. Quan Cervantes  Next MD visit: none scheduled  Fall Risk: standard Seated R shoulder ER with wand 1 x 10   with wand 1 x 10 5 sec holds              MANUAL TX 23 mins 5 mins      NAG's (R) C6        STM to (R) scalenes, UT, levator scap 10 mins STM to R upper trap x 6 minutes      (R) scapulothoracic 5 mins in prone

## 2021-12-16 ENCOUNTER — OFFICE VISIT (OUTPATIENT)
Dept: PHYSICAL THERAPY | Age: 56
End: 2021-12-16
Attending: ORTHOPAEDIC SURGERY
Payer: COMMERCIAL

## 2021-12-16 DIAGNOSIS — M75.21 BICEPS TENDINITIS OF RIGHT SHOULDER: ICD-10-CM

## 2021-12-16 DIAGNOSIS — M54.12 RIGHT CERVICAL RADICULOPATHY: ICD-10-CM

## 2021-12-16 DIAGNOSIS — R52 PAIN: ICD-10-CM

## 2021-12-16 PROCEDURE — 97140 MANUAL THERAPY 1/> REGIONS: CPT

## 2021-12-16 PROCEDURE — 97110 THERAPEUTIC EXERCISES: CPT

## 2021-12-16 NOTE — PROGRESS NOTES
Dx: Pain (R52)  Right cervical radiculopathy (M54.12)  Biceps tendinitis of right shoulder (M75.21)            Insurance (Authorized # of Visits):  6           Authorizing Physician: Dr. Yenni Barnard  Next MD visit: none scheduled  Fall Risk: standard With 2-3# DB  1 x 5 ea (pain discontinue today) -     Seated R shoulder ER   with wand 1 x 10 5 sec holds      Supine c-retractions   3 x 10 3 sec holds     Standing B shoulder ER with V[address] 2 x 10        Standing R shoulder ER walkouts   With YTB 1 x 5 reps

## 2021-12-20 ENCOUNTER — OFFICE VISIT (OUTPATIENT)
Dept: PHYSICAL THERAPY | Age: 56
End: 2021-12-20
Attending: ORTHOPAEDIC SURGERY
Payer: COMMERCIAL

## 2021-12-20 DIAGNOSIS — M54.12 RIGHT CERVICAL RADICULOPATHY: ICD-10-CM

## 2021-12-20 DIAGNOSIS — R52 PAIN: ICD-10-CM

## 2021-12-20 DIAGNOSIS — M75.21 BICEPS TENDINITIS OF RIGHT SHOULDER: ICD-10-CM

## 2021-12-20 PROCEDURE — 97140 MANUAL THERAPY 1/> REGIONS: CPT | Performed by: PHYSICAL THERAPIST

## 2021-12-20 PROCEDURE — 97110 THERAPEUTIC EXERCISES: CPT | Performed by: PHYSICAL THERAPIST

## 2021-12-20 NOTE — PROGRESS NOTES
Dx: Pain (R52)  Right cervical radiculopathy (M54.12)  Biceps tendinitis of right shoulder (M75.21)            Insurance (Authorized # of Visits):  6           Authorizing Physician: Dr. Eryn Mayorga  Next MD visit: none scheduled  Fall Risk: standard 10 With [facility] 2 x 10 With [facility] 10 x 3    Standing B high scap rows  With GSC 2 x 10 With GSC 3 x 10 With GSC 10 x 3    Supine R shoulder ER  1 x 10  With YTB 1 x 10 -     Prone scap rows   With 2-3# DB  1 x 5 ea (pain discontinue today) -     Seated R shoulde

## 2021-12-23 ENCOUNTER — OFFICE VISIT (OUTPATIENT)
Dept: PHYSICAL THERAPY | Age: 56
End: 2021-12-23
Attending: ORTHOPAEDIC SURGERY
Payer: COMMERCIAL

## 2021-12-23 DIAGNOSIS — M54.12 RIGHT CERVICAL RADICULOPATHY: ICD-10-CM

## 2021-12-23 DIAGNOSIS — R52 PAIN: ICD-10-CM

## 2021-12-23 DIAGNOSIS — M75.21 BICEPS TENDINITIS OF RIGHT SHOULDER: ICD-10-CM

## 2021-12-23 PROCEDURE — 97140 MANUAL THERAPY 1/> REGIONS: CPT | Performed by: PHYSICAL THERAPIST

## 2021-12-23 PROCEDURE — 97110 THERAPEUTIC EXERCISES: CPT | Performed by: PHYSICAL THERAPIST

## 2021-12-23 NOTE — PROGRESS NOTES
Dx: Pain (R52)  Right cervical radiculopathy (M54.12)  Biceps tendinitis of right shoulder (M75.21)            Insurance (Authorized # of Visits):  6           Authorizing Physician: Dr. Yenni Barnard  Next MD visit: none scheduled  Fall Risk: standard 1# 10 x 3   Seated c-extension   1 x 10 2 x 10     Seated R/L levator scap stretch 3 sec holds  1 x 5 ea -     Standing B shoulder horizontal abd/add   With [facility] 2 x 10 With [facility] 2 x 10 With [facility] 10 x 3 With [facility] 10 x 3   Standing B high scap rows  With GSC 2

## 2021-12-27 ENCOUNTER — OFFICE VISIT (OUTPATIENT)
Dept: OPTOMETRY | Facility: CLINIC | Age: 56
End: 2021-12-27

## 2021-12-27 ENCOUNTER — OFFICE VISIT (OUTPATIENT)
Dept: PHYSICAL THERAPY | Age: 56
End: 2021-12-27
Attending: ORTHOPAEDIC SURGERY
Payer: COMMERCIAL

## 2021-12-27 DIAGNOSIS — R52 PAIN: ICD-10-CM

## 2021-12-27 DIAGNOSIS — H25.13 AGE-RELATED NUCLEAR CATARACT OF BOTH EYES: ICD-10-CM

## 2021-12-27 DIAGNOSIS — H52.4 PRESBYOPIA: ICD-10-CM

## 2021-12-27 DIAGNOSIS — M75.21 BICEPS TENDINITIS OF RIGHT SHOULDER: ICD-10-CM

## 2021-12-27 DIAGNOSIS — M54.12 RIGHT CERVICAL RADICULOPATHY: ICD-10-CM

## 2021-12-27 DIAGNOSIS — E11.9 TYPE 2 DIABETES MELLITUS WITHOUT COMPLICATION, WITHOUT LONG-TERM CURRENT USE OF INSULIN (HCC): Primary | ICD-10-CM

## 2021-12-27 PROCEDURE — 92014 COMPRE OPH EXAM EST PT 1/>: CPT | Performed by: OPTOMETRIST

## 2021-12-27 PROCEDURE — 97110 THERAPEUTIC EXERCISES: CPT

## 2021-12-27 PROCEDURE — 97140 MANUAL THERAPY 1/> REGIONS: CPT

## 2021-12-27 NOTE — PROGRESS NOTES
Dx: Pain (R52)  Right cervical radiculopathy (M54.12)  Biceps tendinitis of right shoulder (M75.21)            Insurance (Authorized # of Visits):  6           Authorizing Physician: Dr. Brenda Velázquez  Next MD visit: none scheduled  Fall Risk: standard around ball 3x10   -   Seated thoracic ext - 10x 10 x 2 -   (L/R) shoulder hyperextension stretch 3 x 10 2 x 10  -   SL (R) shoulder ER 1# 3 x 10 2# 3 x 10 2# 10 x 3 2# 10 x 3   SL (R) scaption  1# 10 x 3 1# 10 x 3 1# 10 x 3    Seated c-extension  2 x 10

## 2021-12-27 NOTE — PROGRESS NOTES
Jeyson Wild is a 64year old female.     HPI:     HPI     Diabetic Eye Exam     Diabetes Type: Type 2, controlled with diet and taking oral medications    Duration: 9 years    Number of years diabetic: 9    Number of years on pills: 9    Number of years o DAY 90 tablet 1   • Microlet Lancets Does not apply Misc TEST AS DIRECTED TWICE DAILY 100 each 5   • CONTOUR NEXT TEST In Vitro Strip TEST TWICE DAILY 200 strip 11   • aspirin 81 MG Oral Tab EC Take 81 mg by mouth daily.      • BD PEN NEEDLE SARAH U/F 32G X Cornea Clear Clear    Anterior Chamber Deep and quiet Deep and quiet    Iris Normal Normal    Lens Trace Nuclear sclerosis Trace Nuclear sclerosis    Vitreous Clear Clear          Fundus Exam       Right Left    Disc Normal Normal    C/D Ratio 0.2 0.2

## 2021-12-30 ENCOUNTER — OFFICE VISIT (OUTPATIENT)
Dept: PHYSICAL THERAPY | Age: 56
End: 2021-12-30
Attending: ORTHOPAEDIC SURGERY
Payer: COMMERCIAL

## 2021-12-30 DIAGNOSIS — R52 PAIN: ICD-10-CM

## 2021-12-30 DIAGNOSIS — M75.21 BICEPS TENDINITIS OF RIGHT SHOULDER: ICD-10-CM

## 2021-12-30 DIAGNOSIS — M54.12 RIGHT CERVICAL RADICULOPATHY: ICD-10-CM

## 2021-12-30 PROCEDURE — 97110 THERAPEUTIC EXERCISES: CPT

## 2021-12-30 NOTE — PROGRESS NOTES
Physical Therapy Discharge Summary    Dx: Pain (R52)  Right cervical radiculopathy (M54.12)  Biceps tendinitis of right shoulder (M75.21)            Insurance (Authorized # of Visits):  6           Authorizing Physician: Dr. Sue Hackett  Next MD visit: none s 40min 33 min 38 min   Supine B shoulder flexion with jonathan  10x3 10x3 10 x 2    Supine serratus punch  With 3lb db 10 x 2 -    Cervical retraction in sitting  10x    B shoulder flexion, horizontal abduction with 2lb wt 10 x 2 10 x 2     R shoulder PROM with

## 2022-01-03 ENCOUNTER — APPOINTMENT (OUTPATIENT)
Dept: PHYSICAL THERAPY | Age: 57
End: 2022-01-03
Attending: ORTHOPAEDIC SURGERY
Payer: COMMERCIAL

## 2022-01-03 ENCOUNTER — HOSPITAL ENCOUNTER (OUTPATIENT)
Dept: ULTRASOUND IMAGING | Facility: HOSPITAL | Age: 57
Discharge: HOME | End: 2022-01-03
Attending: PHYSICIAN ASSISTANT
Payer: COMMERCIAL

## 2022-01-03 DIAGNOSIS — R92.8 ABNORMAL MAMMOGRAM: ICD-10-CM

## 2022-01-03 PROCEDURE — 76642 ULTRASOUND BREAST LIMITED: CPT | Performed by: PHYSICIAN ASSISTANT

## 2022-01-03 RX ORDER — LANCETS
EACH MISCELLANEOUS
Qty: 100 EACH | Refills: 5 | Status: SHIPPED | OUTPATIENT
Start: 2022-01-03

## 2022-01-04 ENCOUNTER — TELEPHONE (OUTPATIENT)
Dept: FAMILY MEDICINE CLINIC | Facility: CLINIC | Age: 57
End: 2022-01-04

## 2022-01-04 NOTE — TELEPHONE ENCOUNTER
----- Message from Cassi Mariee PA-C sent at 1/3/2022  7:35 PM CST -----  Mammogram is benign. Repeat mammogram in 11 months.

## 2022-01-26 RX ORDER — ATORVASTATIN CALCIUM 20 MG/1
TABLET, FILM COATED ORAL
Qty: 180 TABLET | Refills: 0 | Status: SHIPPED | OUTPATIENT
Start: 2022-01-26

## 2022-02-23 ENCOUNTER — LAB ENCOUNTER (OUTPATIENT)
Dept: LAB | Age: 57
End: 2022-02-23
Attending: FAMILY MEDICINE
Payer: COMMERCIAL

## 2022-02-23 ENCOUNTER — TELEPHONE (OUTPATIENT)
Dept: ENDOCRINOLOGY CLINIC | Facility: CLINIC | Age: 57
End: 2022-02-23

## 2022-02-23 ENCOUNTER — OFFICE VISIT (OUTPATIENT)
Dept: ENDOCRINOLOGY CLINIC | Facility: CLINIC | Age: 57
End: 2022-02-23
Payer: COMMERCIAL

## 2022-02-23 VITALS
HEART RATE: 78 BPM | WEIGHT: 145 LBS | SYSTOLIC BLOOD PRESSURE: 130 MMHG | BODY MASS INDEX: 26.68 KG/M2 | DIASTOLIC BLOOD PRESSURE: 80 MMHG | HEIGHT: 62 IN

## 2022-02-23 DIAGNOSIS — E03.9 HYPOTHYROIDISM, UNSPECIFIED TYPE: ICD-10-CM

## 2022-02-23 DIAGNOSIS — E11.65 TYPE 2 DIABETES MELLITUS WITH HYPERGLYCEMIA, WITHOUT LONG-TERM CURRENT USE OF INSULIN (HCC): Primary | ICD-10-CM

## 2022-02-23 LAB
CARTRIDGE LOT#: ABNORMAL NUMERIC
GLUCOSE BLOOD: 177
HEMOGLOBIN A1C: 7.6 % (ref 4.3–5.6)
T3FREE SERPL-MCNC: 2.05 PG/ML (ref 2.4–4.2)
T4 FREE SERPL-MCNC: 0.7 NG/DL (ref 0.8–1.7)
TEST STRIP LOT #: NORMAL NUMERIC
THYROGLOB SERPL-MCNC: 252 U/ML (ref ?–60)
TSI SER-ACNC: 8.14 MIU/ML (ref 0.36–3.74)

## 2022-02-23 PROCEDURE — 3008F BODY MASS INDEX DOCD: CPT | Performed by: NURSE PRACTITIONER

## 2022-02-23 PROCEDURE — 82947 ASSAY GLUCOSE BLOOD QUANT: CPT | Performed by: NURSE PRACTITIONER

## 2022-02-23 PROCEDURE — 86376 MICROSOMAL ANTIBODY EACH: CPT

## 2022-02-23 PROCEDURE — 36415 COLL VENOUS BLD VENIPUNCTURE: CPT

## 2022-02-23 PROCEDURE — 83036 HEMOGLOBIN GLYCOSYLATED A1C: CPT | Performed by: NURSE PRACTITIONER

## 2022-02-23 PROCEDURE — 36416 COLLJ CAPILLARY BLOOD SPEC: CPT | Performed by: NURSE PRACTITIONER

## 2022-02-23 PROCEDURE — 84481 FREE ASSAY (FT-3): CPT

## 2022-02-23 PROCEDURE — 84443 ASSAY THYROID STIM HORMONE: CPT

## 2022-02-23 PROCEDURE — 86800 THYROGLOBULIN ANTIBODY: CPT

## 2022-02-23 PROCEDURE — 84439 ASSAY OF FREE THYROXINE: CPT

## 2022-02-23 PROCEDURE — 3079F DIAST BP 80-89 MM HG: CPT | Performed by: NURSE PRACTITIONER

## 2022-02-23 PROCEDURE — 3075F SYST BP GE 130 - 139MM HG: CPT | Performed by: NURSE PRACTITIONER

## 2022-02-23 PROCEDURE — 3051F HG A1C>EQUAL 7.0%<8.0%: CPT | Performed by: NURSE PRACTITIONER

## 2022-02-23 PROCEDURE — 99214 OFFICE O/P EST MOD 30 MIN: CPT | Performed by: NURSE PRACTITIONER

## 2022-02-23 RX ORDER — DULAGLUTIDE 1.5 MG/.5ML
1.5 INJECTION, SOLUTION SUBCUTANEOUS WEEKLY
Qty: 2 ML | Refills: 0 | Status: SHIPPED | OUTPATIENT
Start: 2022-03-16

## 2022-02-23 RX ORDER — DULAGLUTIDE 0.75 MG/.5ML
0.75 INJECTION, SOLUTION SUBCUTANEOUS WEEKLY
Qty: 2 ML | Refills: 0 | Status: SHIPPED | OUTPATIENT
Start: 2022-02-23

## 2022-02-23 NOTE — PATIENT INSTRUCTIONS
A1C: 7.6% today -->slight increase from 7.3% on 2/23/2022  Blood glucose: 177 in clinic today    Medications:   -continue with Metformin 1,000mg twice daily   -continue with Glimepiride 4mg once daily   -continue with Invokana 300mg once daily in AM  -start trulicity 5.92GF one weekly for 4 weeks, then increase to 1.5mg once weekly   Common side effects:    Nausea or diarrhea    These effects usually go away over time as your body gets used to the medicine      Here are some things that might help your nausea go away:    Eat small amounts of food instrad of few large meals   Eat plain, bland non greasy foods    Drink plenty of fluids (sugar free)    Avoid foods and smells that might make you sick    Eat slowly and listen to your hunger    Weight:  Wt Readings from Last 6 Encounters:  02/23/22 : 145 lb (65.8 kg)  11/01/21 : 153 lb (69.4 kg)  10/04/21 : 153 lb (69.4 kg)  07/28/21 : 145 lb 9.6 oz (66 kg)  06/15/21 : 160 lb (72.6 kg)  03/24/21 : 147 lb 12.8 oz (67 kg)    A1C goal:  <7.0%    Blood sugar testing:  Test your blood sugar 1 time daily   Recommended times to test: alternate with before breakfast (fasting) or 2hrs after meals     Blood sugar targets:  Before breakfast:   (preferably < 110)  Before meals OR 2 hours after meals: <150    Call for persistent blood sugars < 75 or > 200.

## 2022-02-23 NOTE — TELEPHONE ENCOUNTER
Diamante/pharm calling regarding two different rx Truicity needs clarification. Please call at [phone],CLYDE.

## 2022-02-23 NOTE — TELEPHONE ENCOUNTER
Chart reviewed and RN called pharmacy to inform them patient will start on 0.75 mg dose for the first month. Then, increase to 1.5 mg thereafter.

## 2022-02-24 ENCOUNTER — TELEPHONE (OUTPATIENT)
Dept: FAMILY MEDICINE CLINIC | Facility: CLINIC | Age: 57
End: 2022-02-24

## 2022-02-24 LAB — THYROPEROXIDASE AB SERPL-ACNC: ABNORMAL U/ML (ref ?–60)

## 2022-02-24 NOTE — TELEPHONE ENCOUNTER
Dr. Joseph Pena - Did you also want Thyroid Ultrasound ordered? If so, when should she complete this? No orders in system. RN called patient. RN spoke with patient. Patient's date of birth and full name both confirmed. RN informed patient of provider's message below. Discussed how to take levothyroxine on empty stomach. She verbalizes understanding and is agreeable to instructions. And will have blood rechecked in 2 months.

## 2022-02-24 NOTE — TELEPHONE ENCOUNTER
----- Message from Migel Sutherland DO sent at 2/24/2022  8:55 AM CST -----  Thyroid underactive rx sent. Also patient to fu in 2 months for follow up blood test.   Thyroid us order entered.

## 2022-03-07 ENCOUNTER — OFFICE VISIT (OUTPATIENT)
Dept: PODIATRY CLINIC | Facility: CLINIC | Age: 57
End: 2022-03-07
Payer: COMMERCIAL

## 2022-03-07 DIAGNOSIS — M72.2 PLANTAR FASCIITIS OF RIGHT FOOT: Primary | ICD-10-CM

## 2022-03-07 DIAGNOSIS — M79.671 CHRONIC HEEL PAIN, RIGHT: ICD-10-CM

## 2022-03-07 DIAGNOSIS — M21.6X1 ACQUIRED EQUINUS DEFORMITY OF RIGHT FOOT: ICD-10-CM

## 2022-03-07 DIAGNOSIS — G89.29 CHRONIC HEEL PAIN, RIGHT: ICD-10-CM

## 2022-03-07 PROCEDURE — 99203 OFFICE O/P NEW LOW 30 MIN: CPT | Performed by: PODIATRIST

## 2022-03-07 RX ORDER — MELOXICAM 15 MG/1
15 TABLET ORAL DAILY PRN
Qty: 30 TABLET | Refills: 1 | Status: SHIPPED | OUTPATIENT
Start: 2022-03-07 | End: 2022-05-06

## 2022-03-10 ENCOUNTER — TELEPHONE (OUTPATIENT)
Dept: PODIATRY CLINIC | Facility: CLINIC | Age: 57
End: 2022-03-10

## 2022-03-10 NOTE — TELEPHONE ENCOUNTER
[facility] is requesting additional information. Please advise. OV note from 3/7/22 provided. For orthotics has patient done these treatments>3 months?     Plantar Fasciitis/Heel Spur Syndrome    >3 months    Proper shoe wear  OTC Arch supports worn > 6 weeks  Stretching/Ice Therapy  NSAIDS  Cortisone injections

## 2022-03-24 ENCOUNTER — TELEPHONE (OUTPATIENT)
Dept: ENDOCRINOLOGY CLINIC | Facility: CLINIC | Age: 57
End: 2022-03-24

## 2022-03-24 RX ORDER — DULAGLUTIDE 0.75 MG/.5ML
INJECTION, SOLUTION SUBCUTANEOUS
Qty: 2 ML | Refills: 0 | OUTPATIENT
Start: 2022-03-24

## 2022-03-24 NOTE — TELEPHONE ENCOUNTER
Per chart review pt is supposed to titrate up to 1.5 mg dose and script for this was sent by [facility] on 2/23/22. Pharmacy confirmed they have the 1.5 mg dose ready since yesterday. They will contact the patient to let her know.

## 2022-03-24 NOTE — TELEPHONE ENCOUNTER
Patient calling to speak with nurse to provide update since taking medication. Patient is feeling well since starting rx. Any questions please call with  at [phone],[facility].

## 2022-03-25 NOTE — TELEPHONE ENCOUNTER
Please just confirm she is referring to the Truliticy. Per TE from 2/23/2022 patient was started on Trulicity 8.85SS subcutaneous weekly for one month but plan is to increase to 1.5mg subcutaneous weekly if she is tolerating medication. If feeling well on Trulicity we can send for the increased dose of 1.5mg subcutaneous weekly. Thanks!

## 2022-03-26 ENCOUNTER — TELEPHONE (OUTPATIENT)
Dept: FAMILY MEDICINE CLINIC | Facility: CLINIC | Age: 57
End: 2022-03-26

## 2022-03-26 ENCOUNTER — TELEPHONE (OUTPATIENT)
Dept: ENDOCRINOLOGY CLINIC | Facility: CLINIC | Age: 57
End: 2022-03-26

## 2022-03-26 DIAGNOSIS — M75.100 NONTRAUMATIC TEAR OF ROTATOR CUFF, UNSPECIFIED LATERALITY, UNSPECIFIED TEAR EXTENT: Primary | ICD-10-CM

## 2022-03-28 RX ORDER — CANAGLIFLOZIN 300 MG/1
TABLET, FILM COATED ORAL
Qty: 90 TABLET | Refills: 1 | Status: SHIPPED | OUTPATIENT
Start: 2022-03-28 | End: 2022-04-01

## 2022-03-29 ENCOUNTER — OFFICE VISIT (OUTPATIENT)
Dept: ORTHOPEDICS CLINIC | Facility: CLINIC | Age: 57
End: 2022-03-29
Payer: COMMERCIAL

## 2022-03-29 DIAGNOSIS — M77.8 DELTOID TENDINITIS OF RIGHT SHOULDER: ICD-10-CM

## 2022-03-29 DIAGNOSIS — M75.121 NONTRAUMATIC COMPLETE TEAR OF ROTATOR CUFF, RIGHT: Primary | ICD-10-CM

## 2022-03-29 DIAGNOSIS — M75.21 BICEPS TENDINITIS OF RIGHT SHOULDER: ICD-10-CM

## 2022-03-29 DIAGNOSIS — M54.12 RIGHT CERVICAL RADICULOPATHY: ICD-10-CM

## 2022-03-29 PROCEDURE — 99213 OFFICE O/P EST LOW 20 MIN: CPT | Performed by: ORTHOPAEDIC SURGERY

## 2022-03-30 NOTE — TELEPHONE ENCOUNTER
Pharmacy comments: plan does not cover medication prescribed. Per Rx benefit plan alternative medications include: Sherif Duff. Please fax back with approval along with strength, directions, quantity, and refills.  THANKS! 3/29

## 2022-04-01 NOTE — TELEPHONE ENCOUNTER
Lupe Salinas noted. Lets switch to Farxiga 10mg once daily in AM.     rx sent to pharmacy. Please inform patient of change. Thank you!

## 2022-04-11 ENCOUNTER — OFFICE VISIT (OUTPATIENT)
Dept: PODIATRY CLINIC | Facility: CLINIC | Age: 57
End: 2022-04-11
Payer: COMMERCIAL

## 2022-04-11 DIAGNOSIS — M21.6X1 ACQUIRED EQUINUS DEFORMITY OF RIGHT FOOT: ICD-10-CM

## 2022-04-11 DIAGNOSIS — M72.2 PLANTAR FASCIITIS OF RIGHT FOOT: Primary | ICD-10-CM

## 2022-04-11 DIAGNOSIS — G89.29 CHRONIC HEEL PAIN, RIGHT: ICD-10-CM

## 2022-04-11 DIAGNOSIS — M79.671 CHRONIC HEEL PAIN, RIGHT: ICD-10-CM

## 2022-04-11 PROCEDURE — 29799 UNLISTED PX CASTING/STRPG: CPT | Performed by: PODIATRIST

## 2022-04-11 PROCEDURE — L3000 FT INSERT UCB BERKELEY SHELL: HCPCS | Performed by: PODIATRIST

## 2022-04-14 ENCOUNTER — HOSPITAL ENCOUNTER (OUTPATIENT)
Dept: MRI IMAGING | Age: 57
Discharge: HOME | End: 2022-04-14
Attending: ORTHOPAEDIC SURGERY
Payer: COMMERCIAL

## 2022-04-14 DIAGNOSIS — M77.8 DELTOID TENDINITIS OF RIGHT SHOULDER: ICD-10-CM

## 2022-04-14 DIAGNOSIS — M75.21 BICEPS TENDINITIS OF RIGHT SHOULDER: ICD-10-CM

## 2022-04-14 DIAGNOSIS — M75.121 NONTRAUMATIC COMPLETE TEAR OF ROTATOR CUFF, RIGHT: ICD-10-CM

## 2022-04-14 PROCEDURE — 73221 MRI JOINT UPR EXTREM W/O DYE: CPT | Performed by: ORTHOPAEDIC SURGERY

## 2022-04-25 RX ORDER — GLIMEPIRIDE 4 MG/1
TABLET ORAL
Qty: 90 TABLET | Refills: 0 | Status: SHIPPED | OUTPATIENT
Start: 2022-04-25

## 2022-05-05 ENCOUNTER — LAB ENCOUNTER (OUTPATIENT)
Dept: LAB | Age: 57
End: 2022-05-05
Attending: FAMILY MEDICINE
Payer: COMMERCIAL

## 2022-05-05 DIAGNOSIS — E03.9 HYPOTHYROIDISM, UNSPECIFIED TYPE: ICD-10-CM

## 2022-05-05 LAB — TSI SER-ACNC: 3.8 MIU/ML (ref 0.36–3.74)

## 2022-05-05 PROCEDURE — 84443 ASSAY THYROID STIM HORMONE: CPT

## 2022-05-05 PROCEDURE — 36415 COLL VENOUS BLD VENIPUNCTURE: CPT

## 2022-05-18 ENCOUNTER — OFFICE VISIT (OUTPATIENT)
Dept: PODIATRY CLINIC | Facility: CLINIC | Age: 57
End: 2022-05-18
Payer: COMMERCIAL

## 2022-05-18 DIAGNOSIS — G89.29 CHRONIC HEEL PAIN, RIGHT: ICD-10-CM

## 2022-05-18 DIAGNOSIS — M21.6X1 ACQUIRED EQUINUS DEFORMITY OF RIGHT FOOT: ICD-10-CM

## 2022-05-18 DIAGNOSIS — M72.2 PLANTAR FASCIITIS OF RIGHT FOOT: Primary | ICD-10-CM

## 2022-05-18 DIAGNOSIS — M79.671 CHRONIC HEEL PAIN, RIGHT: ICD-10-CM

## 2022-05-18 PROCEDURE — 99212 OFFICE O/P EST SF 10 MIN: CPT | Performed by: PODIATRIST

## 2022-06-10 ENCOUNTER — TELEPHONE (OUTPATIENT)
Dept: INTERNAL MEDICINE CLINIC | Facility: CLINIC | Age: 57
End: 2022-06-10

## 2022-06-10 NOTE — TELEPHONE ENCOUNTER
Patient is due for an annual [address]. Left msg for patient to return phone call, please schedule appropriately  [address] [place] [address] [employer] list.     Px due after 6/18/22    Unable to leave vm, busy dial tone.

## 2022-06-29 ENCOUNTER — LAB ENCOUNTER (OUTPATIENT)
Dept: LAB | Age: 57
End: 2022-06-29
Attending: NURSE PRACTITIONER
Payer: COMMERCIAL

## 2022-06-29 ENCOUNTER — OFFICE VISIT (OUTPATIENT)
Dept: ENDOCRINOLOGY CLINIC | Facility: CLINIC | Age: 57
End: 2022-06-29
Payer: COMMERCIAL

## 2022-06-29 VITALS
WEIGHT: 146 LBS | HEART RATE: 98 BPM | BODY MASS INDEX: 27 KG/M2 | SYSTOLIC BLOOD PRESSURE: 107 MMHG | DIASTOLIC BLOOD PRESSURE: 73 MMHG

## 2022-06-29 DIAGNOSIS — E03.9 HYPOTHYROIDISM, UNSPECIFIED TYPE: ICD-10-CM

## 2022-06-29 DIAGNOSIS — E03.9 ACQUIRED HYPOTHYROIDISM: ICD-10-CM

## 2022-06-29 DIAGNOSIS — E11.65 UNCONTROLLED TYPE 2 DIABETES MELLITUS WITH HYPERGLYCEMIA (HCC): Primary | ICD-10-CM

## 2022-06-29 LAB
CARTRIDGE LOT#: ABNORMAL NUMERIC
GLUCOSE BLOOD: 91
HEMOGLOBIN A1C: 6.7 % (ref 4.3–5.6)
T4 FREE SERPL-MCNC: 0.7 NG/DL (ref 0.8–1.7)
TEST STRIP LOT #: NORMAL NUMERIC
TSI SER-ACNC: 10.1 MIU/ML (ref 0.36–3.74)

## 2022-06-29 PROCEDURE — 3044F HG A1C LEVEL LT 7.0%: CPT | Performed by: NURSE PRACTITIONER

## 2022-06-29 PROCEDURE — 83036 HEMOGLOBIN GLYCOSYLATED A1C: CPT | Performed by: NURSE PRACTITIONER

## 2022-06-29 PROCEDURE — 84443 ASSAY THYROID STIM HORMONE: CPT

## 2022-06-29 PROCEDURE — 3078F DIAST BP <80 MM HG: CPT | Performed by: NURSE PRACTITIONER

## 2022-06-29 PROCEDURE — 84439 ASSAY OF FREE THYROXINE: CPT

## 2022-06-29 PROCEDURE — 82947 ASSAY GLUCOSE BLOOD QUANT: CPT | Performed by: NURSE PRACTITIONER

## 2022-06-29 PROCEDURE — 3074F SYST BP LT 130 MM HG: CPT | Performed by: NURSE PRACTITIONER

## 2022-06-29 PROCEDURE — 99214 OFFICE O/P EST MOD 30 MIN: CPT | Performed by: NURSE PRACTITIONER

## 2022-06-29 PROCEDURE — 36415 COLL VENOUS BLD VENIPUNCTURE: CPT

## 2022-06-29 NOTE — PATIENT INSTRUCTIONS
A1C: 6.7% today --> decrease from 7.6% on 2/23/2022  Blood glucose: 91 in clinic today    Medications:   -continue with Metformin 1,000mg twice daily   -continue with Glimepiride 4mg once daily   -continue with Invokana 300mg once daily    -continue with trulicity 5.9WP once weekly      -repeat thyroid labs today   -increase fiber in diet     Weight:  Wt Readings from Last 6 Encounters:  06/29/22 : 146 lb (66.2 kg)  02/23/22 : 145 lb (65.8 kg)  11/01/21 : 153 lb (69.4 kg)  10/04/21 : 153 lb (69.4 kg)  07/28/21 : 145 lb 9.6 oz (66 kg)  06/15/21 : 160 lb (72.6 kg)    A1C goal:  <7.0%    Blood sugar testing:  Test your blood sugar 1 time daily   Recommended times to test: Before breakfast (fasting) or 2hrs after meals     Blood sugar targets:  Before breakfast:   (preferably < 110)  2 hours after meals: <150    Call for persistent blood sugars < 75 or > 200.

## 2022-06-30 ENCOUNTER — TELEPHONE (OUTPATIENT)
Dept: ENDOCRINOLOGY CLINIC | Facility: CLINIC | Age: 57
End: 2022-06-30

## 2022-06-30 DIAGNOSIS — E03.9 ACQUIRED HYPOTHYROIDISM: Primary | ICD-10-CM

## 2022-06-30 RX ORDER — LEVOTHYROXINE SODIUM 0.1 MG/1
100 TABLET ORAL
Qty: 60 TABLET | Refills: 0 | Status: SHIPPED | OUTPATIENT
Start: 2022-06-30 | End: 2022-08-29

## 2022-06-30 NOTE — TELEPHONE ENCOUNTER
----- Message from SALLY Abel sent at 6/30/2022 10:32 AM CDT -----  Endo staff please try to call patient after 3pm (after work): I have called patient but NA and VM was not set up, thus unable to leave msg. Thyroid labs results are back and they demonstrate hypothyroidism. I have sent a new rx to the pharmacy for levothyroxine 100mcg once daily (she previously was taking 75mcg daily)      She needs to repeat her thyroid labs again in 8 weeks. These labs have been entered and she may have them done any day on or after 8/25/22. Thank you!

## 2022-06-30 NOTE — PROGRESS NOTES
Endo staff please try to call patient after 3pm (after work): I have called patient but NA and VM was not set up, thus unable to leave msg. Thyroid labs results are back and they demonstrate hypothyroidism. I have sent a new rx to the pharmacy for levothyroxine 100mcg once daily (she previously was taking 75mcg daily)      She needs to repeat her thyroid labs again in 8 weeks. These labs have been entered and she may have them done any day on or after 8/25/22. Thank you!

## 2022-07-05 RX ORDER — DAPAGLIFLOZIN 10 MG/1
TABLET, FILM COATED ORAL
Qty: 90 TABLET | Refills: 0 | Status: SHIPPED | OUTPATIENT
Start: 2022-07-05

## 2022-07-05 RX ORDER — LEVOTHYROXINE SODIUM 0.1 MG/1
TABLET ORAL
Qty: 90 TABLET | Refills: 0 | Status: SHIPPED | OUTPATIENT
Start: 2022-07-05

## 2022-07-06 NOTE — TELEPHONE ENCOUNTER
rn called patient with message below by Jamal Clark np .  Patient verbalized understanding of message , will start medication and will do labs in august

## 2022-07-12 ENCOUNTER — OFFICE VISIT (OUTPATIENT)
Dept: PODIATRY CLINIC | Facility: CLINIC | Age: 57
End: 2022-07-12
Payer: COMMERCIAL

## 2022-07-12 ENCOUNTER — HOSPITAL ENCOUNTER (OUTPATIENT)
Dept: GENERAL RADIOLOGY | Age: 57
Discharge: HOME | End: 2022-07-12
Attending: PODIATRIST
Payer: COMMERCIAL

## 2022-07-12 DIAGNOSIS — M72.2 PLANTAR FASCIITIS OF RIGHT FOOT: Primary | ICD-10-CM

## 2022-07-12 DIAGNOSIS — M79.671 CHRONIC HEEL PAIN, RIGHT: ICD-10-CM

## 2022-07-12 DIAGNOSIS — G89.29 CHRONIC HEEL PAIN, RIGHT: ICD-10-CM

## 2022-07-12 DIAGNOSIS — M21.6X1 ACQUIRED EQUINUS DEFORMITY OF RIGHT FOOT: ICD-10-CM

## 2022-07-12 DIAGNOSIS — M72.2 PLANTAR FASCIITIS OF RIGHT FOOT: ICD-10-CM

## 2022-07-12 PROCEDURE — 99212 OFFICE O/P EST SF 10 MIN: CPT | Performed by: PODIATRIST

## 2022-07-12 PROCEDURE — 73650 X-RAY EXAM OF HEEL: CPT | Performed by: PODIATRIST

## 2022-07-25 RX ORDER — GLIMEPIRIDE 4 MG/1
TABLET ORAL
Qty: 90 TABLET | Refills: 0 | Status: SHIPPED | OUTPATIENT
Start: 2022-07-25

## 2022-07-25 RX ORDER — DULAGLUTIDE 1.5 MG/.5ML
INJECTION, SOLUTION SUBCUTANEOUS
Qty: 6 ML | Refills: 0 | Status: SHIPPED | OUTPATIENT
Start: 2022-07-25

## 2022-08-01 ENCOUNTER — OFFICE VISIT (OUTPATIENT)
Dept: PODIATRY CLINIC | Facility: CLINIC | Age: 57
End: 2022-08-01
Payer: COMMERCIAL

## 2022-08-01 VITALS — SYSTOLIC BLOOD PRESSURE: 114 MMHG | DIASTOLIC BLOOD PRESSURE: 78 MMHG | HEART RATE: 96 BPM

## 2022-08-01 DIAGNOSIS — M21.6X1 ACQUIRED EQUINUS DEFORMITY OF RIGHT FOOT: ICD-10-CM

## 2022-08-01 DIAGNOSIS — G89.29 CHRONIC HEEL PAIN, RIGHT: ICD-10-CM

## 2022-08-01 DIAGNOSIS — M72.2 PLANTAR FASCIITIS OF RIGHT FOOT: Primary | ICD-10-CM

## 2022-08-01 DIAGNOSIS — M79.671 CHRONIC HEEL PAIN, RIGHT: ICD-10-CM

## 2022-08-01 RX ORDER — TRIAMCINOLONE ACETONIDE 40 MG/ML
40 INJECTION, SUSPENSION INTRA-ARTICULAR; INTRAMUSCULAR ONCE
Status: COMPLETED | OUTPATIENT
Start: 2022-08-01 | End: 2022-08-01

## 2022-08-01 RX ADMIN — TRIAMCINOLONE ACETONIDE 40 MG: 40 INJECTION, SUSPENSION INTRA-ARTICULAR; INTRAMUSCULAR at 14:22:00

## 2022-08-01 NOTE — PROGRESS NOTES
Per Dr. Joanne Pierre, draw up 1 ml of Kenalog 40 and 1 ml of 0.5 % Marcaine for injection to Right foot.

## 2022-08-19 ENCOUNTER — TELEPHONE (OUTPATIENT)
Dept: CASE MANAGEMENT | Age: 57
End: 2022-08-19

## 2022-08-19 DIAGNOSIS — Z12.11 COLON CANCER SCREENING: Primary | ICD-10-CM

## 2022-09-23 NOTE — TELEPHONE ENCOUNTER
Pt's son picked up, not on CARMEL. States call back after 12:30. Pt is at work. The patient is a 59y Female complaining of abdominal pain.

## 2022-09-29 ENCOUNTER — LAB ENCOUNTER (OUTPATIENT)
Dept: LAB | Age: 57
End: 2022-09-29
Attending: NURSE PRACTITIONER
Payer: COMMERCIAL

## 2022-09-29 DIAGNOSIS — E03.9 ACQUIRED HYPOTHYROIDISM: ICD-10-CM

## 2022-09-29 LAB
T4 FREE SERPL-MCNC: 1.2 NG/DL (ref 0.8–1.7)
TSI SER-ACNC: 1.31 MIU/ML (ref 0.36–3.74)

## 2022-09-29 PROCEDURE — 84443 ASSAY THYROID STIM HORMONE: CPT

## 2022-09-29 PROCEDURE — 36415 COLL VENOUS BLD VENIPUNCTURE: CPT

## 2022-09-29 PROCEDURE — 84439 ASSAY OF FREE THYROXINE: CPT

## 2022-10-05 ENCOUNTER — TELEPHONE (OUTPATIENT)
Dept: FAMILY MEDICINE CLINIC | Facility: CLINIC | Age: 57
End: 2022-10-05

## 2022-10-05 DIAGNOSIS — E11.9 TYPE 2 DIABETES MELLITUS WITHOUT COMPLICATION, UNSPECIFIED WHETHER LONG TERM INSULIN USE (HCC): Primary | ICD-10-CM

## 2022-10-05 NOTE — TELEPHONE ENCOUNTER
The [facility] [place] department is currently outreaching to your diabetic patients. This patient is due for a urine microalbumin. Please sign pending order and route back to me to contact patient.

## 2022-10-06 RX ORDER — DAPAGLIFLOZIN 10 MG/1
TABLET, FILM COATED ORAL
Qty: 90 TABLET | Refills: 0 | Status: SHIPPED | OUTPATIENT
Start: 2022-10-06

## 2022-10-06 NOTE — TELEPHONE ENCOUNTER
LOV: 6/29/2022    RTC: 6 months    F/U: 1/4/2023    Young Parkinson, DELORISN-- orders pending, approve if agreeable.

## 2022-10-13 ENCOUNTER — LAB ENCOUNTER (OUTPATIENT)
Dept: LAB | Age: 57
End: 2022-10-13
Attending: FAMILY MEDICINE
Payer: COMMERCIAL

## 2022-10-13 DIAGNOSIS — E11.9 TYPE 2 DIABETES MELLITUS WITHOUT COMPLICATION, UNSPECIFIED WHETHER LONG TERM INSULIN USE (HCC): ICD-10-CM

## 2022-10-13 LAB
CREAT UR-SCNC: 40.8 MG/DL
MICROALBUMIN UR-MCNC: 0.59 MG/DL
MICROALBUMIN/CREAT 24H UR-RTO: 14.5 UG/MG (ref ?–30)

## 2022-10-13 PROCEDURE — 3061F NEG MICROALBUMINURIA REV: CPT | Performed by: FAMILY MEDICINE

## 2022-10-13 PROCEDURE — 82043 UR ALBUMIN QUANTITATIVE: CPT

## 2022-10-13 PROCEDURE — 82570 ASSAY OF URINE CREATININE: CPT

## 2022-10-24 RX ORDER — GLIMEPIRIDE 4 MG/1
TABLET ORAL
Qty: 90 TABLET | Refills: 0 | Status: SHIPPED | OUTPATIENT
Start: 2022-10-24

## 2022-10-27 RX ORDER — DULAGLUTIDE 1.5 MG/.5ML
INJECTION, SOLUTION SUBCUTANEOUS
Qty: 6 ML | Refills: 0 | Status: SHIPPED | OUTPATIENT
Start: 2022-10-27

## 2022-11-29 ENCOUNTER — HOSPITAL ENCOUNTER (OUTPATIENT)
Dept: GENERAL RADIOLOGY | Age: 57
Discharge: HOME | End: 2022-11-29
Attending: FAMILY MEDICINE
Payer: COMMERCIAL

## 2022-11-29 ENCOUNTER — OFFICE VISIT (OUTPATIENT)
Dept: FAMILY MEDICINE CLINIC | Facility: CLINIC | Age: 57
End: 2022-11-29
Payer: COMMERCIAL

## 2022-11-29 VITALS
WEIGHT: 141 LBS | BODY MASS INDEX: 25.95 KG/M2 | SYSTOLIC BLOOD PRESSURE: 114 MMHG | TEMPERATURE: 98 F | DIASTOLIC BLOOD PRESSURE: 75 MMHG | HEIGHT: 62 IN | HEART RATE: 93 BPM

## 2022-11-29 DIAGNOSIS — Z12.31 SCREENING MAMMOGRAM, ENCOUNTER FOR: ICD-10-CM

## 2022-11-29 DIAGNOSIS — R05.1 ACUTE COUGH: ICD-10-CM

## 2022-11-29 DIAGNOSIS — R05.1 ACUTE COUGH: Primary | ICD-10-CM

## 2022-11-29 PROBLEM — J44.9 CHRONIC OBSTRUCTIVE PULMONARY DISEASE, UNSPECIFIED (HCC): Status: ACTIVE | Noted: 2022-11-29

## 2022-11-29 PROCEDURE — 3008F BODY MASS INDEX DOCD: CPT | Performed by: FAMILY MEDICINE

## 2022-11-29 PROCEDURE — 3078F DIAST BP <80 MM HG: CPT | Performed by: FAMILY MEDICINE

## 2022-11-29 PROCEDURE — 99213 OFFICE O/P EST LOW 20 MIN: CPT | Performed by: FAMILY MEDICINE

## 2022-11-29 PROCEDURE — 71046 X-RAY EXAM CHEST 2 VIEWS: CPT | Performed by: FAMILY MEDICINE

## 2022-11-29 PROCEDURE — 3074F SYST BP LT 130 MM HG: CPT | Performed by: FAMILY MEDICINE

## 2022-11-29 RX ORDER — BENZONATATE 100 MG/1
100 CAPSULE ORAL 3 TIMES DAILY PRN
Qty: 45 CAPSULE | Refills: 0 | Status: SHIPPED | OUTPATIENT
Start: 2022-11-29

## 2022-11-29 RX ORDER — CODEINE PHOSPHATE AND GUAIFENESIN 10; 100 MG/5ML; MG/5ML
10 SOLUTION ORAL EVERY 6 HOURS PRN
Qty: 236 ML | Refills: 0 | Status: SHIPPED | OUTPATIENT
Start: 2022-11-29 | End: 2022-12-01

## 2022-11-29 RX ORDER — FLUTICASONE PROPIONATE AND SALMETEROL XINAFOATE 230; 21 UG/1; UG/1
2 AEROSOL, METERED RESPIRATORY (INHALATION) 2 TIMES DAILY
Qty: 1 EACH | Refills: 1 | Status: SHIPPED | OUTPATIENT
Start: 2022-11-29

## 2022-11-30 ENCOUNTER — TELEPHONE (OUTPATIENT)
Dept: FAMILY MEDICINE CLINIC | Facility: CLINIC | Age: 57
End: 2022-11-30

## 2022-11-30 LAB — SARS-COV-2 RNA RESP QL NAA+PROBE: NOT DETECTED

## 2022-12-01 ENCOUNTER — OFFICE VISIT (OUTPATIENT)
Dept: OPHTHALMOLOGY | Facility: CLINIC | Age: 57
End: 2022-12-01
Payer: COMMERCIAL

## 2022-12-01 ENCOUNTER — TELEPHONE (OUTPATIENT)
Dept: FAMILY MEDICINE CLINIC | Facility: CLINIC | Age: 57
End: 2022-12-01

## 2022-12-01 DIAGNOSIS — H25.13 AGE-RELATED NUCLEAR CATARACT OF BOTH EYES: ICD-10-CM

## 2022-12-01 DIAGNOSIS — E11.9 DIABETES MELLITUS TYPE 2 WITHOUT RETINOPATHY (HCC): Primary | ICD-10-CM

## 2022-12-01 PROCEDURE — 92014 COMPRE OPH EXAM EST PT 1/>: CPT | Performed by: OPHTHALMOLOGY

## 2022-12-01 PROCEDURE — 2023F DILAT RTA XM W/O RTNOPTHY: CPT | Performed by: OPHTHALMOLOGY

## 2022-12-01 RX ORDER — PROMETHAZINE HYDROCHLORIDE AND CODEINE PHOSPHATE 6.25; 1 MG/5ML; MG/5ML
5 SYRUP ORAL EVERY 6 HOURS PRN
Qty: 180 ML | Refills: 0 | Status: SHIPPED | OUTPATIENT
Start: 2022-12-01 | End: 2022-12-01

## 2022-12-01 RX ORDER — DEXTROMETHORPHAN HYDROBROMIDE AND PROMETHAZINE HYDROCHLORIDE 15; 6.25 MG/5ML; MG/5ML
5 SYRUP ORAL 4 TIMES DAILY PRN
Qty: 118 ML | Refills: 0 | Status: SHIPPED | OUTPATIENT
Start: 2022-12-01 | End: 2022-12-01

## 2022-12-01 RX ORDER — DEXTROMETHORPHAN HYDROBROMIDE AND PROMETHAZINE HYDROCHLORIDE 15; 6.25 MG/5ML; MG/5ML
5 SYRUP ORAL 4 TIMES DAILY PRN
Qty: 118 ML | Refills: 0 | Status: SHIPPED | OUTPATIENT
Start: 2022-12-01

## 2022-12-01 NOTE — TELEPHONE ENCOUNTER
Pharmacy calling regarding promethazine-codeine 6.25-10 MG/5ML Oral Syrup  This is no longer being dispensed, please send alternative

## 2022-12-01 NOTE — TELEPHONE ENCOUNTER
Tried to reach patient to inform medication originally prescribed for cough was not covered. New rx sent by Marc Tobin.

## 2022-12-01 NOTE — PATIENT INSTRUCTIONS
Diabetes mellitus type 2 without retinopathy ([facility] [address]  Diabetes type II: no background of retinopathy, no signs of neovascularization noted. Discussed ocular and systemic benefits of blood sugar control. Diagnosis and treatment discussed in detail with patient. Age-related nuclear cataract of both eyes  Discussed mild cataracts in both eyes that are not affecting vision and are not surgical at this time.

## 2022-12-12 ENCOUNTER — IMMUNIZATION (OUTPATIENT)
Dept: FAMILY MEDICINE CLINIC | Facility: CLINIC | Age: 57
End: 2022-12-12
Payer: COMMERCIAL

## 2022-12-12 DIAGNOSIS — Z23 NEED FOR VACCINATION: Primary | ICD-10-CM

## 2022-12-12 PROCEDURE — 90471 IMMUNIZATION ADMIN: CPT | Performed by: FAMILY MEDICINE

## 2022-12-12 PROCEDURE — 90686 IIV4 VACC NO PRSV 0.5 ML IM: CPT | Performed by: FAMILY MEDICINE

## 2022-12-12 NOTE — TELEPHONE ENCOUNTER
With  Enriqueta Gutierrez #[id number], patient advised of notes below. Patient verbalized understanding. See also 12/1/2022 telephone encounter.

## 2022-12-12 NOTE — TELEPHONE ENCOUNTER
With  Bhumi Magaña #[id number], patient advised of notes below. Patient verbalized understanding and picked up medication. Stated that she is feeling fine now, that the cough resolved. Scheduled patient for the flu shot today with the nurse at [facility] in [address] [place] [address]. [id number] - [place] [address] [address] [facility], [phone], [phone]      Disp Refills Start End    promethazine-dextromethorphan 6.25-15 MG/5ML Oral Syrup 118 mL 0 12/1/2022     Sig - Route: Take 5 mL by mouth 4 (four) times daily as needed for cough.  - Oral    Sent to pharmacy as: Promethazine-DM 6.25-15 MG/5ML Oral Syrup (Promethazine-DM)    E-Prescribing Status: Receipt confirmed by pharmacy (12/1/2022 12:39 PM CST)

## 2023-01-04 ENCOUNTER — OFFICE VISIT (OUTPATIENT)
Dept: ENDOCRINOLOGY CLINIC | Facility: CLINIC | Age: 58
End: 2023-01-04
Payer: COMMERCIAL

## 2023-01-04 VITALS
BODY MASS INDEX: 26 KG/M2 | SYSTOLIC BLOOD PRESSURE: 130 MMHG | DIASTOLIC BLOOD PRESSURE: 80 MMHG | WEIGHT: 141 LBS | HEART RATE: 68 BPM

## 2023-01-04 DIAGNOSIS — E11.65 TYPE 2 DIABETES MELLITUS WITH HYPERGLYCEMIA, WITHOUT LONG-TERM CURRENT USE OF INSULIN (HCC): Primary | ICD-10-CM

## 2023-01-04 DIAGNOSIS — E03.9 ACQUIRED HYPOTHYROIDISM: ICD-10-CM

## 2023-01-04 DIAGNOSIS — E78.5 HYPERLIPIDEMIA, UNSPECIFIED HYPERLIPIDEMIA TYPE: ICD-10-CM

## 2023-01-04 LAB
CARTRIDGE LOT#: ABNORMAL NUMERIC
GLUCOSE BLOOD: 136
HEMOGLOBIN A1C: 7.1 % (ref 4.3–5.6)
TEST STRIP LOT #: NORMAL NUMERIC

## 2023-01-04 PROCEDURE — 3051F HG A1C>EQUAL 7.0%<8.0%: CPT | Performed by: NURSE PRACTITIONER

## 2023-01-04 PROCEDURE — 3079F DIAST BP 80-89 MM HG: CPT | Performed by: NURSE PRACTITIONER

## 2023-01-04 PROCEDURE — 83036 HEMOGLOBIN GLYCOSYLATED A1C: CPT | Performed by: NURSE PRACTITIONER

## 2023-01-04 PROCEDURE — 82947 ASSAY GLUCOSE BLOOD QUANT: CPT | Performed by: NURSE PRACTITIONER

## 2023-01-04 PROCEDURE — 99214 OFFICE O/P EST MOD 30 MIN: CPT | Performed by: NURSE PRACTITIONER

## 2023-01-04 PROCEDURE — 3075F SYST BP GE 130 - 139MM HG: CPT | Performed by: NURSE PRACTITIONER

## 2023-01-04 RX ORDER — LEVOTHYROXINE SODIUM 0.1 MG/1
100 TABLET ORAL
Qty: 90 TABLET | Refills: 0 | Status: SHIPPED | OUTPATIENT
Start: 2023-01-04 | End: 2023-04-04

## 2023-01-04 NOTE — PATIENT INSTRUCTIONS
A1C: 7.1% today -->slight increase from 6.7% on 6/29/2022  Blood glucose: 136 in clinic today    Medications:   -continue with Metformin 1,000mg twice daily   -continue with Glimepiride 4mg once daily in AM   -continue with Invokana 300mg once daily  in AM   -continue with Trulicity 8.7MD once weekly on Tuesdays     -continue with Levothyroxine 100mcg once daily in AM     Check labs in 1-2 months from now     Weight:  Wt Readings from Last 6 Encounters:  01/04/23 : 141 lb (64 kg)  11/29/22 : 141 lb (64 kg)  06/29/22 : 146 lb (66.2 kg)  02/23/22 : 145 lb (65.8 kg)  11/01/21 : 153 lb (69.4 kg)  10/04/21 : 153 lb (69.4 kg)      A1C goal:   <7.0%    Blood sugar testing:  Test your blood sugar 1 time daily   Recommended times to test: alternate with before breakfast (fasting) or 2hrs after meals     Blood sugar targets:  Before breakfast:   (preferably < 110)  2 hours after meals: <150     Call for persistent blood sugars < 75 or > 200

## 2023-01-09 RX ORDER — DAPAGLIFLOZIN 10 MG/1
TABLET, FILM COATED ORAL
Qty: 90 TABLET | Refills: 0 | Status: SHIPPED | OUTPATIENT
Start: 2023-01-09

## 2023-01-09 NOTE — TELEPHONE ENCOUNTER
LOV: 1/4/2023    RTC: 8 months     F/U: 9/6/2023    EROS Landry-- orders pending, approve if appropriate

## 2023-01-23 RX ORDER — GLIMEPIRIDE 4 MG/1
4 TABLET ORAL
Qty: 90 TABLET | Refills: 0 | Status: SHIPPED | OUTPATIENT
Start: 2023-01-23

## 2023-01-24 RX ORDER — DULAGLUTIDE 1.5 MG/.5ML
INJECTION, SOLUTION SUBCUTANEOUS
Qty: 6 ML | Refills: 0 | Status: SHIPPED | OUTPATIENT
Start: 2023-01-24

## 2023-02-20 ENCOUNTER — LAB ENCOUNTER (OUTPATIENT)
Dept: LAB | Age: 58
End: 2023-02-20
Attending: NURSE PRACTITIONER
Payer: COMMERCIAL

## 2023-02-20 DIAGNOSIS — E11.65 TYPE 2 DIABETES MELLITUS WITH HYPERGLYCEMIA, WITHOUT LONG-TERM CURRENT USE OF INSULIN (HCC): ICD-10-CM

## 2023-02-20 DIAGNOSIS — E03.9 ACQUIRED HYPOTHYROIDISM: ICD-10-CM

## 2023-02-20 DIAGNOSIS — E78.5 HYPERLIPIDEMIA, UNSPECIFIED HYPERLIPIDEMIA TYPE: ICD-10-CM

## 2023-02-20 LAB
ALBUMIN SERPL-MCNC: 4 G/DL (ref 3.4–5)
ALBUMIN/GLOB SERPL: 1.1 {RATIO} (ref 1–2)
ALP LIVER SERPL-CCNC: 64 U/L
ALT SERPL-CCNC: 52 U/L
ANION GAP SERPL CALC-SCNC: 6 MMOL/L (ref 0–18)
AST SERPL-CCNC: 24 U/L (ref 15–37)
BILIRUB SERPL-MCNC: 0.6 MG/DL (ref 0.1–2)
BUN BLD-MCNC: 20 MG/DL (ref 7–18)
BUN/CREAT SERPL: 29 (ref 10–20)
CALCIUM BLD-MCNC: 9.4 MG/DL (ref 8.5–10.1)
CHLORIDE SERPL-SCNC: 111 MMOL/L (ref 98–112)
CHOLEST SERPL-MCNC: 140 MG/DL (ref ?–200)
CO2 SERPL-SCNC: 27 MMOL/L (ref 21–32)
CREAT BLD-MCNC: 0.69 MG/DL
CREAT UR-SCNC: 34.8 MG/DL
FASTING PATIENT LIPID ANSWER: YES
FASTING STATUS PATIENT QL REPORTED: YES
GFR SERPLBLD BASED ON 1.73 SQ M-ARVRAT: 101 ML/MIN/1.73M2 (ref 60–?)
GLOBULIN PLAS-MCNC: 3.6 G/DL (ref 2.8–4.4)
GLUCOSE BLD-MCNC: 132 MG/DL (ref 70–99)
HDLC SERPL-MCNC: 34 MG/DL (ref 40–59)
LDLC SERPL CALC-MCNC: 80 MG/DL (ref ?–100)
MICROALBUMIN UR-MCNC: <0.5 MG/DL
NONHDLC SERPL-MCNC: 106 MG/DL (ref ?–130)
OSMOLALITY SERPL CALC.SUM OF ELEC: 302 MOSM/KG (ref 275–295)
POTASSIUM SERPL-SCNC: 4.6 MMOL/L (ref 3.5–5.1)
PROT SERPL-MCNC: 7.6 G/DL (ref 6.4–8.2)
SODIUM SERPL-SCNC: 144 MMOL/L (ref 136–145)
T4 FREE SERPL-MCNC: 1.1 NG/DL (ref 0.8–1.7)
TRIGL SERPL-MCNC: 151 MG/DL (ref 30–149)
TSI SER-ACNC: 0.73 MIU/ML (ref 0.36–3.74)
VLDLC SERPL CALC-MCNC: 24 MG/DL (ref 0–30)

## 2023-02-20 PROCEDURE — 80061 LIPID PANEL: CPT

## 2023-02-20 PROCEDURE — 36415 COLL VENOUS BLD VENIPUNCTURE: CPT

## 2023-02-20 PROCEDURE — 84443 ASSAY THYROID STIM HORMONE: CPT

## 2023-02-20 PROCEDURE — 80053 COMPREHEN METABOLIC PANEL: CPT

## 2023-02-20 PROCEDURE — 3061F NEG MICROALBUMINURIA REV: CPT | Performed by: FAMILY MEDICINE

## 2023-02-20 PROCEDURE — 84439 ASSAY OF FREE THYROXINE: CPT

## 2023-02-20 PROCEDURE — 82570 ASSAY OF URINE CREATININE: CPT

## 2023-02-20 PROCEDURE — 82043 UR ALBUMIN QUANTITATIVE: CPT

## 2023-03-27 RX ORDER — ATORVASTATIN CALCIUM 20 MG/1
TABLET, FILM COATED ORAL
Qty: 180 TABLET | Refills: 0 | Status: SHIPPED | OUTPATIENT
Start: 2023-03-27

## 2023-04-10 RX ORDER — DAPAGLIFLOZIN 10 MG/1
TABLET, FILM COATED ORAL
Qty: 90 TABLET | Refills: 0 | Status: SHIPPED | OUTPATIENT
Start: 2023-04-10

## 2023-04-24 RX ORDER — GLIMEPIRIDE 4 MG/1
TABLET ORAL
Qty: 90 TABLET | Refills: 0 | Status: SHIPPED | OUTPATIENT
Start: 2023-04-24

## 2023-04-24 NOTE — TELEPHONE ENCOUNTER
LOV: 1/04/2023  RTC: 8m, upcoming 9/06/2023  Last refill: 1/23/2023    M. 64 Claudia Zaidi, APRN. -please review and sign pended prescription if agreeable.

## 2023-05-01 DIAGNOSIS — E03.9 ACQUIRED HYPOTHYROIDISM: ICD-10-CM

## 2023-05-01 RX ORDER — LEVOTHYROXINE SODIUM 0.1 MG/1
TABLET ORAL
Qty: 90 TABLET | Refills: 0 | Status: SHIPPED | OUTPATIENT
Start: 2023-05-01

## 2023-05-08 RX ORDER — LANCETS
EACH MISCELLANEOUS
Qty: 100 EACH | Refills: 5 | Status: SHIPPED | OUTPATIENT
Start: 2023-05-08

## 2023-05-25 ENCOUNTER — OFFICE VISIT (OUTPATIENT)
Dept: FAMILY MEDICINE CLINIC | Facility: CLINIC | Age: 58
End: 2023-05-25

## 2023-05-25 VITALS
WEIGHT: 143 LBS | HEIGHT: 62 IN | BODY MASS INDEX: 26.31 KG/M2 | DIASTOLIC BLOOD PRESSURE: 70 MMHG | HEART RATE: 102 BPM | SYSTOLIC BLOOD PRESSURE: 115 MMHG

## 2023-05-25 DIAGNOSIS — E11.9 TYPE 2 DIABETES MELLITUS WITHOUT COMPLICATION, UNSPECIFIED WHETHER LONG TERM INSULIN USE (HCC): ICD-10-CM

## 2023-05-25 DIAGNOSIS — R35.0 FREQUENT URINATION: Primary | ICD-10-CM

## 2023-05-25 LAB
BILIRUBIN: NEGATIVE
GLUCOSE (URINE DIPSTICK): 500 MG/DL
KETONES (URINE DIPSTICK): NEGATIVE MG/DL
LEUKOCYTES: NEGATIVE
MULTISTIX EXPIRATION DATE: ABNORMAL DATE
NITRITE, URINE: NEGATIVE
OCCULT BLOOD: NEGATIVE
PH, URINE: 6.5 (ref 4.5–8)
PROTEIN (URINE DIPSTICK): NEGATIVE MG/DL
SPECIFIC GRAVITY: 1.01 (ref 1–1.03)
URINE-COLOR: YELLOW
UROBILINOGEN,SEMI-QN: 0.2 MG/DL (ref 0–1.9)

## 2023-05-25 PROCEDURE — 3078F DIAST BP <80 MM HG: CPT | Performed by: FAMILY MEDICINE

## 2023-05-25 PROCEDURE — 81002 URINALYSIS NONAUTO W/O SCOPE: CPT | Performed by: FAMILY MEDICINE

## 2023-05-25 PROCEDURE — 3008F BODY MASS INDEX DOCD: CPT | Performed by: FAMILY MEDICINE

## 2023-05-25 PROCEDURE — 99213 OFFICE O/P EST LOW 20 MIN: CPT | Performed by: FAMILY MEDICINE

## 2023-05-25 PROCEDURE — 3074F SYST BP LT 130 MM HG: CPT | Performed by: FAMILY MEDICINE

## 2023-05-25 PROCEDURE — 90471 IMMUNIZATION ADMIN: CPT | Performed by: FAMILY MEDICINE

## 2023-05-25 PROCEDURE — 90750 HZV VACC RECOMBINANT IM: CPT | Performed by: FAMILY MEDICINE

## 2023-05-25 NOTE — PROGRESS NOTES
Blood pressure 115/70, pulse 102, height 5' 2\" (1.575 m), weight 143 lb (64.9 kg), not currently breastfeeding. Complaining of burning with urination. No fever, chills or vomiting. Also following up for diabetes. Some vaginal discharge. Objective    Bilateral barefoot skin diabetic exam is normal, visualized feet and the appearance is normal.  Bilateral monofilament/sensation of both feet is normal.  Pulsation pedal pulse exam of both lower legs/feet is normal as well.     Urine dipstick normal    Assessment #1 dysuria #2 type 2 diabetes    Plan #1 we will send urine for culture Monistat cream #2 diabetic eye exam ordered also    Mammogram order printed out given to patient

## 2023-06-08 ENCOUNTER — TELEPHONE (OUTPATIENT)
Dept: FAMILY MEDICINE CLINIC | Facility: CLINIC | Age: 58
End: 2023-06-08

## 2023-06-08 DIAGNOSIS — N76.0 ACUTE VAGINITIS: Primary | ICD-10-CM

## 2023-06-08 NOTE — TELEPHONE ENCOUNTER
Spoke with patient, (  Name and  verified ) informed of Dr. Marisela Beck instructions below    Provided information for:     [facility] to [facility] [facility]  Phone     [facility]  [address] [facility] [place]

## 2023-06-08 NOTE — TELEPHONE ENCOUNTER
Spoke to patient (name and  of patient verified). She reports treatment did not work, still has burning and itching on vulvar skin especially when urinating or applying ointment. Patient reports she used Monistat 7 as directed by Dr. Haroon Patel, it helped a little, but symptoms are back and bothersome. Patient reports she is using Bacitracin ointment with minimal relief and only using externally where rash is. Offered follow up appointment, patient declines and states Dr. Haroon Patel advised her to let him know if symptoms were persistent. Per progress note by Dr. Haroon Patel 23:  \"Plan #1 we will send urine for culture Monistat cream #2\"    Dr. Haroon Patel, please advise on recommendations for persistent vulvar rash. Thank you.

## 2023-06-26 RX ORDER — DULAGLUTIDE 1.5 MG/.5ML
1.5 INJECTION, SOLUTION SUBCUTANEOUS WEEKLY
Qty: 6 ML | Refills: 0 | Status: SHIPPED | OUTPATIENT
Start: 2023-06-26

## 2023-06-26 NOTE — TELEPHONE ENCOUNTER
LOV: 1/4/2023    RTC: 8 months    F/U: 9/6/2023    SALLY Ford-- orders pending, approve if agreeable.

## 2023-07-24 RX ORDER — GLIMEPIRIDE 4 MG/1
4 TABLET ORAL
Qty: 90 TABLET | Refills: 0 | Status: SHIPPED | OUTPATIENT
Start: 2023-07-24

## 2023-08-02 ENCOUNTER — NURSE ONLY (OUTPATIENT)
Dept: INTERNAL MEDICINE CLINIC | Facility: CLINIC | Age: 58
End: 2023-08-02

## 2023-08-02 DIAGNOSIS — Z23 NEED FOR SHINGLES VACCINE: Primary | ICD-10-CM

## 2023-08-02 PROCEDURE — 90750 HZV VACC RECOMBINANT IM: CPT | Performed by: FAMILY MEDICINE

## 2023-08-02 PROCEDURE — 90471 IMMUNIZATION ADMIN: CPT | Performed by: FAMILY MEDICINE

## 2023-08-02 NOTE — PROGRESS NOTES
Patient came in for 2nd dose of shingles vaccine. Patient tolerated well to vaccination, full name and  verified.

## 2023-09-06 ENCOUNTER — OFFICE VISIT (OUTPATIENT)
Dept: ENDOCRINOLOGY CLINIC | Facility: CLINIC | Age: 58
End: 2023-09-06

## 2023-09-06 VITALS
HEIGHT: 62.01 IN | SYSTOLIC BLOOD PRESSURE: 108 MMHG | HEART RATE: 88 BPM | DIASTOLIC BLOOD PRESSURE: 62 MMHG | WEIGHT: 140 LBS | BODY MASS INDEX: 25.44 KG/M2

## 2023-09-06 DIAGNOSIS — E03.9 ACQUIRED HYPOTHYROIDISM: ICD-10-CM

## 2023-09-06 DIAGNOSIS — L60.8 DISCOLORATION OF NAILBEDS: ICD-10-CM

## 2023-09-06 DIAGNOSIS — E11.9 TYPE 2 DIABETES MELLITUS WITHOUT COMPLICATION, WITHOUT LONG-TERM CURRENT USE OF INSULIN (HCC): Primary | ICD-10-CM

## 2023-09-06 LAB
CARTRIDGE LOT#: ABNORMAL NUMERIC
GLUCOSE BLOOD: 223
HEMOGLOBIN A1C: 7 % (ref 4.3–5.6)
TEST STRIP LOT #: NORMAL NUMERIC

## 2023-09-06 PROCEDURE — 82947 ASSAY GLUCOSE BLOOD QUANT: CPT | Performed by: NURSE PRACTITIONER

## 2023-09-06 PROCEDURE — 3078F DIAST BP <80 MM HG: CPT | Performed by: NURSE PRACTITIONER

## 2023-09-06 PROCEDURE — 3074F SYST BP LT 130 MM HG: CPT | Performed by: NURSE PRACTITIONER

## 2023-09-06 PROCEDURE — 3051F HG A1C>EQUAL 7.0%<8.0%: CPT | Performed by: NURSE PRACTITIONER

## 2023-09-06 PROCEDURE — 3008F BODY MASS INDEX DOCD: CPT | Performed by: NURSE PRACTITIONER

## 2023-09-06 PROCEDURE — 83036 HEMOGLOBIN GLYCOSYLATED A1C: CPT | Performed by: NURSE PRACTITIONER

## 2023-09-06 PROCEDURE — 99214 OFFICE O/P EST MOD 30 MIN: CPT | Performed by: NURSE PRACTITIONER

## 2023-09-06 RX ORDER — LEVOTHYROXINE SODIUM 0.1 MG/1
100 TABLET ORAL
Qty: 90 TABLET | Refills: 1 | Status: SHIPPED | OUTPATIENT
Start: 2023-09-06

## 2023-09-06 RX ORDER — ATORVASTATIN CALCIUM 40 MG/1
40 TABLET, FILM COATED ORAL NIGHTLY
Qty: 90 TABLET | Refills: 1 | Status: SHIPPED | OUTPATIENT
Start: 2023-09-06

## 2023-09-06 NOTE — PATIENT INSTRUCTIONS
A1C: 7.0% today; previously was 7.1% on 1/4/2023  Blood glucose: 223 in clinic today    Medications:   -continue with Metformin 1,000mg twice daily   -continue with Glimepiride 4mg once daily   -continue with Invokana 300mg once daily    -continue with Trulicity 2.6IB once weekly     Wt Readings from Last 6 Encounters:  09/06/23 : 140 lb (63.5 kg)  05/25/23 : 143 lb (64.9 kg)  01/04/23 : 141 lb (64 kg)  11/29/22 : 141 lb (64 kg)  06/29/22 : 146 lb (66.2 kg)  02/23/22 : 145 lb (65.8 kg)    A1C goal:  <7.0%    Blood sugar testing:  Test your blood sugar 2 times daily   Recommended times to test: Before breakfast (fasting) and 2hrs after meals      Blood sugar targets:  Before breakfast:   (preferably < 110)  Before meals OR 2 hours after meals: <180 (preferably <150)     Call for persistent blood sugars < 75 or > 200

## 2023-09-19 RX ORDER — DULAGLUTIDE 1.5 MG/.5ML
1.5 INJECTION, SOLUTION SUBCUTANEOUS
Qty: 6 ML | Refills: 0 | Status: SHIPPED | OUTPATIENT
Start: 2023-09-19

## 2023-10-10 ENCOUNTER — OFFICE VISIT (OUTPATIENT)
Dept: FAMILY MEDICINE CLINIC | Facility: CLINIC | Age: 58
End: 2023-10-10

## 2023-10-10 ENCOUNTER — HOSPITAL ENCOUNTER (OUTPATIENT)
Dept: GENERAL RADIOLOGY | Age: 58
Discharge: HOME | End: 2023-10-10
Attending: FAMILY MEDICINE
Payer: COMMERCIAL

## 2023-10-10 VITALS
SYSTOLIC BLOOD PRESSURE: 116 MMHG | HEART RATE: 101 BPM | BODY MASS INDEX: 25.83 KG/M2 | WEIGHT: 140.38 LBS | DIASTOLIC BLOOD PRESSURE: 77 MMHG | HEIGHT: 62 IN

## 2023-10-10 DIAGNOSIS — G89.29 CHRONIC LEFT SHOULDER PAIN: ICD-10-CM

## 2023-10-10 DIAGNOSIS — Z12.31 SCREENING MAMMOGRAM, ENCOUNTER FOR: ICD-10-CM

## 2023-10-10 DIAGNOSIS — Z12.11 ENCOUNTER FOR SCREENING COLONOSCOPY: ICD-10-CM

## 2023-10-10 DIAGNOSIS — E78.5 HYPERLIPIDEMIA, UNSPECIFIED HYPERLIPIDEMIA TYPE: ICD-10-CM

## 2023-10-10 DIAGNOSIS — E78.2 MIXED HYPERLIPIDEMIA: ICD-10-CM

## 2023-10-10 DIAGNOSIS — M54.10 RADICULOPATHY, UNSPECIFIED SPINAL REGION: ICD-10-CM

## 2023-10-10 DIAGNOSIS — E11.9 DIABETES MELLITUS TYPE 2 WITHOUT RETINOPATHY (HCC): Primary | ICD-10-CM

## 2023-10-10 DIAGNOSIS — M25.512 CHRONIC LEFT SHOULDER PAIN: ICD-10-CM

## 2023-10-10 PROCEDURE — 72050 X-RAY EXAM NECK SPINE 4/5VWS: CPT | Performed by: FAMILY MEDICINE

## 2023-10-10 PROCEDURE — 3078F DIAST BP <80 MM HG: CPT | Performed by: FAMILY MEDICINE

## 2023-10-10 PROCEDURE — 3074F SYST BP LT 130 MM HG: CPT | Performed by: FAMILY MEDICINE

## 2023-10-10 PROCEDURE — 99213 OFFICE O/P EST LOW 20 MIN: CPT | Performed by: FAMILY MEDICINE

## 2023-10-10 PROCEDURE — 73030 X-RAY EXAM OF SHOULDER: CPT | Performed by: FAMILY MEDICINE

## 2023-10-10 PROCEDURE — 3008F BODY MASS INDEX DOCD: CPT | Performed by: FAMILY MEDICINE

## 2023-10-10 RX ORDER — IBUPROFEN 600 MG/1
600 TABLET ORAL EVERY 8 HOURS PRN
Qty: 90 TABLET | Refills: 0 | Status: SHIPPED | OUTPATIENT
Start: 2023-10-10 | End: 2023-11-09

## 2023-10-10 NOTE — PROGRESS NOTES
Blood pressure 116/77, pulse 101, height 5' 2\" (1.575 m), weight 140 lb 6.4 oz (63.7 kg), not currently breastfeeding. Complaining of left upper arm pain for 2 weeks. No injuries but does a lot of overhead work on the job. No neck pain. No weakness in the fingers but cannot lift shoulder because of pain. Some relief with ibuprofen 400 mg. She is able to sleep.     Objective    Left upper extremity obvious weakness secondary to pain with abduction of the shoulder    Muscle strength +5/5 left hand other fields of motion are intact    Negative Spurling test bilaterally    Positive Maricel's test on the left      Assessment left shoulder arthropathy versus rotator cuff tendinopathy versus cervical radicular pain    Plan C-spine x-ray and left shoulder x-ray ordered    Ibuprofen prescription and referral to orthopedics

## 2023-10-11 ENCOUNTER — TELEPHONE (OUTPATIENT)
Dept: FAMILY MEDICINE CLINIC | Facility: CLINIC | Age: 58
End: 2023-10-11

## 2023-10-11 NOTE — TELEPHONE ENCOUNTER
LMTCB Fresh[employer] message sent, see results note.      ----- Message from Rena Gastelum DO sent at 10/10/2023  5:00 PM CDT -----  Arthritis seen neck and shoulder.   Patient to make appointment to see orthopedics as referred

## 2023-10-23 RX ORDER — GLIMEPIRIDE 4 MG/1
4 TABLET ORAL
Qty: 90 TABLET | Refills: 0 | Status: SHIPPED | OUTPATIENT
Start: 2023-10-23

## 2024-01-09 RX ORDER — IBUPROFEN 600 MG/1
600 TABLET ORAL EVERY 8 HOURS PRN
Qty: 90 TABLET | Refills: 0 | OUTPATIENT
Start: 2024-01-09

## 2024-01-09 NOTE — TELEPHONE ENCOUNTER
Rx last filled 10/10/23 #90#0. Please advise if refill appropriate?    Requested Prescriptions   Pending Prescriptions Disp Refills    IBUPROFEN 600 MG Oral Tab [Pharmacy Med Name: IBUPROFEN 600MG TABLETS] 90 tablet 0     Sig: TAKE 1 TABLET(600 MG) BY MOUTH EVERY 8 HOURS WITH MEALS AS NEEDED FOR PAIN OR INFLAMMATION       Non-Narcotic Pain Medication Protocol Passed - 1/7/2024  1:49 PM        Passed - In person appointment or virtual visit in the past 6 mos or appointment in next 3 mos     Recent Outpatient Visits              3 months ago Diabetes mellitus type 2 without retinopathy ([facility])    [facility] [place] Cespedes, David B, DO    Office Visit    4 months ago Type 2 diabetes mellitus without complication, without long-term current use of insulin ([facility]    Office Visit    5 months ago Need for shingles vaccine    [facility] [address] [place]    Nurse Only    7 months ago Frequent urination    [facility] [place] Cespedes, David B, DO    Office Visit    1 year ago Type 2 diabetes mellitus with hyperglycemia, without long-term current use of insulin ([facility]    [facility] Visit          Future Appointments         Provider Department Appt Notes    In 3 months Lorrie Hannah APRN [facility] [place] Return in about 8 months (around 5/6/2024) for follow up.

## 2024-01-10 NOTE — TELEPHONE ENCOUNTER
dapagliflozin (FARXIGA) 10 MG Oral Tab, Take 1 tablet (10 mg total) by mouth daily., Disp: 90 tablet, Rfl: 0

## 2024-01-17 ENCOUNTER — TELEPHONE (OUTPATIENT)
Dept: ENDOCRINOLOGY CLINIC | Facility: CLINIC | Age: 59
End: 2024-01-17

## 2024-01-17 DIAGNOSIS — E11.9 TYPE 2 DIABETES MELLITUS WITHOUT COMPLICATION, WITHOUT LONG-TERM CURRENT USE OF INSULIN (HCC): Primary | ICD-10-CM

## 2024-01-17 RX ORDER — GLIMEPIRIDE 4 MG/1
4 TABLET ORAL
Qty: 90 TABLET | Refills: 0 | Status: SHIPPED | OUTPATIENT
Start: 2024-01-17

## 2024-01-17 RX ORDER — DULAGLUTIDE 1.5 MG/.5ML
1.5 INJECTION, SOLUTION SUBCUTANEOUS WEEKLY
Qty: 6 ML | Refills: 0 | Status: SHIPPED | OUTPATIENT
Start: 2024-01-17

## 2024-01-17 NOTE — TELEPHONE ENCOUNTER
Spoke to the pharmacist at [facility]: insurance prefers 30 days supply at a time:  Farxiga $15 for 30 days  Glimepiride $5 for 30 days  Patient has picked up MTF  Refill sent for trulcity 1.5mg   Spoke to patient to provide update - patient stated understanding and will go to pharmacy tomorrow  F/U scheduled 5/6/24

## 2024-01-17 NOTE — TELEPHONE ENCOUNTER
Pt states that insurance will not cover Farxiga, Glimepiride or Trulicity and she is completely out of all medication.  Please call.

## 2024-02-14 ENCOUNTER — LAB ENCOUNTER (OUTPATIENT)
Dept: LAB | Age: 59
End: 2024-02-14
Attending: NURSE PRACTITIONER
Payer: COMMERCIAL

## 2024-02-14 DIAGNOSIS — E11.9 TYPE 2 DIABETES MELLITUS WITHOUT COMPLICATION, WITHOUT LONG-TERM CURRENT USE OF INSULIN (HCC): ICD-10-CM

## 2024-02-14 DIAGNOSIS — E03.9 ACQUIRED HYPOTHYROIDISM: ICD-10-CM

## 2024-02-14 DIAGNOSIS — Z12.11 ENCOUNTER FOR SCREENING COLONOSCOPY: ICD-10-CM

## 2024-02-14 LAB
ALBUMIN SERPL-MCNC: 4.4 G/DL (ref 3.2–4.8)
ALBUMIN/GLOB SERPL: 1.5 {RATIO} (ref 1–2)
ALP LIVER SERPL-CCNC: 64 U/L
ALT SERPL-CCNC: 34 U/L
ANION GAP SERPL CALC-SCNC: 8 MMOL/L (ref 0–18)
AST SERPL-CCNC: 24 U/L (ref ?–34)
BILIRUB SERPL-MCNC: 0.6 MG/DL (ref 0.3–1.2)
BUN BLD-MCNC: 17 MG/DL (ref 9–23)
BUN/CREAT SERPL: 24.3 (ref 10–20)
CALCIUM BLD-MCNC: 9.6 MG/DL (ref 8.7–10.4)
CHLORIDE SERPL-SCNC: 108 MMOL/L (ref 98–112)
CHOLEST SERPL-MCNC: 129 MG/DL (ref ?–200)
CO2 SERPL-SCNC: 26 MMOL/L (ref 21–32)
CREAT BLD-MCNC: 0.7 MG/DL
CREAT UR-SCNC: 40.8 MG/DL
EGFRCR SERPLBLD CKD-EPI 2021: 100 ML/MIN/1.73M2 (ref 60–?)
FASTING PATIENT LIPID ANSWER: YES
FASTING STATUS PATIENT QL REPORTED: YES
GLOBULIN PLAS-MCNC: 3 G/DL (ref 2.8–4.4)
GLUCOSE BLD-MCNC: 101 MG/DL (ref 70–99)
HDLC SERPL-MCNC: 35 MG/DL (ref 40–59)
LDLC SERPL CALC-MCNC: 72 MG/DL (ref ?–100)
MICROALBUMIN UR-MCNC: <0.3 MG/DL
NONHDLC SERPL-MCNC: 94 MG/DL (ref ?–130)
OSMOLALITY SERPL CALC.SUM OF ELEC: 296 MOSM/KG (ref 275–295)
POTASSIUM SERPL-SCNC: 3.8 MMOL/L (ref 3.5–5.1)
PROT SERPL-MCNC: 7.4 G/DL (ref 5.7–8.2)
SODIUM SERPL-SCNC: 142 MMOL/L (ref 136–145)
T4 FREE SERPL-MCNC: 1.3 NG/DL (ref 0.8–1.7)
TRIGL SERPL-MCNC: 119 MG/DL (ref 30–149)
TSI SER-ACNC: 0.9 MIU/ML (ref 0.55–4.78)
VLDLC SERPL CALC-MCNC: 18 MG/DL (ref 0–30)

## 2024-02-14 PROCEDURE — 36415 COLL VENOUS BLD VENIPUNCTURE: CPT

## 2024-02-14 PROCEDURE — 80053 COMPREHEN METABOLIC PANEL: CPT

## 2024-02-14 PROCEDURE — 82570 ASSAY OF URINE CREATININE: CPT

## 2024-02-14 PROCEDURE — 84439 ASSAY OF FREE THYROXINE: CPT

## 2024-02-14 PROCEDURE — 80061 LIPID PANEL: CPT

## 2024-02-14 PROCEDURE — 82043 UR ALBUMIN QUANTITATIVE: CPT

## 2024-02-14 PROCEDURE — 82274 ASSAY TEST FOR BLOOD FECAL: CPT

## 2024-02-14 PROCEDURE — 84443 ASSAY THYROID STIM HORMONE: CPT

## 2024-02-14 RX ORDER — ATORVASTATIN CALCIUM 40 MG/1
40 TABLET, FILM COATED ORAL NIGHTLY
Qty: 90 TABLET | Refills: 1 | Status: SHIPPED | OUTPATIENT
Start: 2024-02-14

## 2024-02-15 LAB — HEMOCCULT STL QL: NEGATIVE

## 2024-03-11 DIAGNOSIS — E03.9 ACQUIRED HYPOTHYROIDISM: ICD-10-CM

## 2024-03-11 RX ORDER — LEVOTHYROXINE SODIUM 0.1 MG/1
100 TABLET ORAL
Qty: 90 TABLET | Refills: 1 | Status: SHIPPED | OUTPATIENT
Start: 2024-03-11

## 2024-03-18 RX ORDER — LANCETS
EACH MISCELLANEOUS
Qty: 100 EACH | Refills: 5 | Status: SHIPPED | OUTPATIENT
Start: 2024-03-18

## 2024-03-18 NOTE — TELEPHONE ENCOUNTER
Endocrine Refill protocol for metformin      Protocol Criteria:    -Appointment with Endocrinology completed in the last 6 months or scheduled in the next 3 months    -GFR greater than or equal to 40 in the past 12 months     -A1c result <8.5% in the past 6 months      Verify the above has been completed or scheduled in the appropriate timeline. If so can send a 90 day supply with 1 refill.            Last completed office visit:09/06/23  Next scheduled Follow up: 05/06/24  Last GFR result: 100  Last A1c result: 7.0%

## 2024-04-03 ENCOUNTER — HOSPITAL ENCOUNTER (OUTPATIENT)
Dept: MAMMOGRAPHY | Age: 59
Discharge: HOME | End: 2024-04-03
Attending: FAMILY MEDICINE
Payer: COMMERCIAL

## 2024-04-03 DIAGNOSIS — Z12.31 SCREENING MAMMOGRAM, ENCOUNTER FOR: ICD-10-CM

## 2024-04-03 PROCEDURE — 77067 SCR MAMMO BI INCL CAD: CPT | Performed by: FAMILY MEDICINE

## 2024-04-03 PROCEDURE — 77063 BREAST TOMOSYNTHESIS BI: CPT | Performed by: FAMILY MEDICINE

## 2024-04-05 ENCOUNTER — TELEPHONE (OUTPATIENT)
Dept: FAMILY MEDICINE CLINIC | Facility: CLINIC | Age: 59
End: 2024-04-05

## 2024-04-05 NOTE — TELEPHONE ENCOUNTER
----- Message from Raphael Hauser DO sent at 4/4/2024  4:15 PM CDT -----  Radiology for follow-up view right breast

## 2024-04-12 RX ORDER — GLIMEPIRIDE 4 MG/1
4 TABLET ORAL
Qty: 90 TABLET | Refills: 1 | Status: SHIPPED | OUTPATIENT
Start: 2024-04-12

## 2024-04-12 NOTE — TELEPHONE ENCOUNTER
Endocrine Refill protocol for oral medications    Protocol Criteria:    -Appointment with Endocrinology completed in the last 6 months or scheduled in the next 3 months       Verify the above has been completed or scheduled in the appropriate timeline. If so can send a 90 day supply with 1 refill.      Last completed office visit: 9/6/23  Next scheduled Follow up: 5/6/24

## 2024-04-15 RX ORDER — DAPAGLIFLOZIN 10 MG/1
10 TABLET, FILM COATED ORAL DAILY
Qty: 90 TABLET | Refills: 1 | Status: SHIPPED | OUTPATIENT
Start: 2024-04-15

## 2024-04-15 NOTE — TELEPHONE ENCOUNTER
Endocrine Refill protocol for oral and injectable diabetic medications    Protocol Criteria:    -Appointment with Endocrinology completed in the last 6 months or scheduled in the next 3 months    -A1c result <8.5% in the past 6 months      Verify the above has been completed or scheduled in the appropriate timeline. If so can send a 90 day supply with 1 refill.     Last completed office visit:09/06/23  Next scheduled Follow up: 05/06/24  Last A1c result: 7.0%

## 2024-05-06 ENCOUNTER — OFFICE VISIT (OUTPATIENT)
Dept: ENDOCRINOLOGY CLINIC | Facility: CLINIC | Age: 59
End: 2024-05-06
Payer: COMMERCIAL

## 2024-05-06 VITALS
DIASTOLIC BLOOD PRESSURE: 79 MMHG | HEIGHT: 62 IN | BODY MASS INDEX: 25.76 KG/M2 | HEART RATE: 70 BPM | SYSTOLIC BLOOD PRESSURE: 121 MMHG | WEIGHT: 140 LBS

## 2024-05-06 DIAGNOSIS — E11.9 TYPE 2 DIABETES MELLITUS WITHOUT COMPLICATION, WITHOUT LONG-TERM CURRENT USE OF INSULIN (HCC): Primary | ICD-10-CM

## 2024-05-06 DIAGNOSIS — E03.9 ACQUIRED HYPOTHYROIDISM: ICD-10-CM

## 2024-05-06 LAB
GLUCOSE BLOOD: 210
HEMOGLOBIN A1C: 7.5 % (ref 4.3–5.6)
TEST STRIP LOT #: 7535 NUMERIC

## 2024-05-06 PROCEDURE — 83036 HEMOGLOBIN GLYCOSYLATED A1C: CPT | Performed by: NURSE PRACTITIONER

## 2024-05-06 PROCEDURE — 3074F SYST BP LT 130 MM HG: CPT | Performed by: NURSE PRACTITIONER

## 2024-05-06 PROCEDURE — 3051F HG A1C>EQUAL 7.0%<8.0%: CPT | Performed by: NURSE PRACTITIONER

## 2024-05-06 PROCEDURE — 3061F NEG MICROALBUMINURIA REV: CPT | Performed by: NURSE PRACTITIONER

## 2024-05-06 PROCEDURE — 3008F BODY MASS INDEX DOCD: CPT | Performed by: NURSE PRACTITIONER

## 2024-05-06 PROCEDURE — 3078F DIAST BP <80 MM HG: CPT | Performed by: NURSE PRACTITIONER

## 2024-05-06 PROCEDURE — 82947 ASSAY GLUCOSE BLOOD QUANT: CPT | Performed by: NURSE PRACTITIONER

## 2024-05-06 PROCEDURE — 99214 OFFICE O/P EST MOD 30 MIN: CPT | Performed by: NURSE PRACTITIONER

## 2024-05-06 RX ORDER — SEMAGLUTIDE 0.68 MG/ML
0.5 INJECTION, SOLUTION SUBCUTANEOUS WEEKLY
Qty: 9 ML | Refills: 0 | Status: SHIPPED | OUTPATIENT
Start: 2024-05-06

## 2024-05-06 RX ORDER — LEVOTHYROXINE SODIUM 0.1 MG/1
100 TABLET ORAL
Qty: 90 TABLET | Refills: 1 | Status: SHIPPED | OUTPATIENT
Start: 2024-05-06

## 2024-05-06 RX ORDER — ATORVASTATIN CALCIUM 40 MG/1
40 TABLET, FILM COATED ORAL NIGHTLY
Qty: 90 TABLET | Refills: 1 | Status: SHIPPED | OUTPATIENT
Start: 2024-05-06

## 2024-05-06 RX ORDER — BLOOD-GLUCOSE METER
1 EACH MISCELLANEOUS DAILY
Qty: 1 KIT | Refills: 0 | Status: SHIPPED | OUTPATIENT
Start: 2024-05-06

## 2024-05-06 RX ORDER — GLIMEPIRIDE 4 MG/1
4 TABLET ORAL
Qty: 90 TABLET | Refills: 1 | Status: SHIPPED | OUTPATIENT
Start: 2024-05-06

## 2024-05-06 RX ORDER — DAPAGLIFLOZIN 10 MG/1
10 TABLET, FILM COATED ORAL DAILY
Qty: 90 TABLET | Refills: 1 | Status: SHIPPED | OUTPATIENT
Start: 2024-05-06

## 2024-05-06 RX ORDER — BLOOD SUGAR DIAGNOSTIC
STRIP MISCELLANEOUS
Qty: 100 STRIP | Refills: 1 | Status: SHIPPED | OUTPATIENT
Start: 2024-05-06

## 2024-05-06 RX ORDER — LANCETS 33 GAUGE
1 EACH MISCELLANEOUS DAILY
Qty: 100 EACH | Refills: 0 | Status: SHIPPED | OUTPATIENT
Start: 2024-05-06

## 2024-05-06 NOTE — PATIENT INSTRUCTIONS
A1C: 7.5% today --> increased from 7.0% on 9/6/2023  Blood glucose: 210 in clinic today    Medications:   -continue with Metformin 1,000mg twice daily   -continue with Glimepiride 4mg once daily   -continue with Farxiga 10mg once daily    - start Ozempic 0.5mg once weekly     Eye doctor options:  -Dr. Toy Gibbons  - [facility]    -Dr. Yao Parker - [facility] [place]  -Dr. Nancy Singh - [facility]   -Dr. Cristobal Mcclellan - [facility]     Weight:  Wt Readings from Last 6 Encounters:   05/06/24 140 lb (63.5 kg)   10/10/23 140 lb 6.4 oz (63.7 kg)   09/06/23 140 lb (63.5 kg)   05/25/23 143 lb (64.9 kg)   01/04/23 141 lb (64 kg)   11/29/22 141 lb (64 kg)     A1C goal:  <7.0%    Blood sugar testing:  Test your blood sugar 1 time daily   Recommended times to test: alternate with before breakfast (fasting) or 2hrs after meals     Blood sugar targets:  Before breakfast:  (preferably < 110)  Before meals OR 2 hours after meals: <180 (preferably <150)     Call for persistent blood sugars < 75 or > 200

## 2024-05-06 NOTE — PROGRESS NOTES
Name: Deepa Albert  Date: 5/6/2024    CHIEF COMPLAINT   Chief Complaint   Patient presents with    Diabetes     Patient presents for a 3 month f/u on diabetes management.      HISTORY OF PRESENT ILLNESS   Deepa Albert is a 59 year old female who presents for follow up on diabetes management.   HbA1C: 7.5% at POC today. This is an increase from 7.0% on 9/6/2023.   Blood glucose is: 210 in clinic today.     DIABETES HISTORY  Diagnosed: around 2012  Prior HbA, C or glycohemoglobin were 8.2% 5/2014; 7.2% 8/2014; 7.7% 11/2014; 8.4% 2/2015; 8.1% 5/2015; 6.5% 11/2015; 6.6% 2/2016; 6.7% 5/2016; 7.1% 9/2016; 7.2% 12/2016; 6.7% 4/2017; 7.2% 10/2017; 7.5% 4/2018; 7.5% 7/2018; 7.4% 4/2019; 7.8% 7/2019; 8.0% 1/2020; 7.7% 8/2020; 7.7% 12/2020; 7.5% 3/2021; 7.3% 2/23/2022; 7.6% 2/23/2022; 6.7% 6/29/2022; 7.1% 1/4/2023; 7.0% 9/2023; 7.5% at POC today;     PREVIOUS MEDICATION FOR DM:  -Ozempic- d/c'e due to abd pain     CURRENT MEDICATIONS FOR DM:  Metformin 1,000mg twice daily   Glimepiride 4mg once daily   Farxiga 10mg once daily   Trulicity 1.5mg once weekly - has not been able to take since 1/2024 due to stock supply shortage at local pharmacy    HOME GLUCOSE READINGS:   Testing BG x 1 daily  Meter or log book today: no  Fasting average: 150 today (ate a little later in the evening the night before); 110s- 120s- 130s; 104; 90  2hrs after dinner: 150s- 170s- 180s   Hypoglycemia present: no - yes - 2 episodes since LOV; usually occurring before lunch due to delayed lunch meal     HISTORY OF DIABETES COMPLICATIONS:  History of Retinopathy: no  - last eye exam within the last 12 months: no - last exam was on 12/1/2022 with Dr. Garnica   History of Neuropathy: no   History of Nephropathy: no     ASSOCIATED COMPLICATIONS:   HTN: no   Hyperlipidemia: yes   Coronary Artery Disease: no   Cerebrovascular Disease: no  Peripheral Vascular Disease: no      DIETARY COMPLIANCE:  Good compliance to low carb diet.     EXERCISE:   yes- stays  active at work and also walks during her lunch break for 30 mins - works at [employer].     Polyuria, polyphagia, polydipsia: no  Paresthesias: no  Blurred vision: no  Recent steroids, illness or infections: no    REVIEW OF SYSTEMS  Constitutional: Negative for: weight change, fever, fatigue, cold/heat intolerance  Eyes: Negative for:  Visual changes, proptosis, blurring  ENT: Negative for:  dysphagia, neck swelling, dysphonia  Respiratory: Negative for: hemoptysis, shortness of breath, cough, or dyspnea.  Cardiovascular: Negative for:  chest pain, chest discomfort, palpitations  GI: Negative for:  abdominal pain, nausea, vomiting, diarrhea, heartburn, constipation  Neurology: Negative for: headache, dizziness, syncope, numbness/tingling, or weakness.   Genito-Urinary: Negative for: dysuria, frequency or hematuria   Hematology/Lymphatics: Negative for: bruising, easy bleeding, lower extremity edema  Endocrine: Negative for: polyuria, polydipsia. No thyroid disease. No osteoporosis.   Skin: Negative for: rash, blister, infection or ulcers.    MEDICATIONS:     Current Outpatient Medications:     dapagliflozin (FARXIGA) 10 MG Oral Tab, Take 1 tablet (10 mg total) by mouth daily., Disp: 90 tablet, Rfl: 1    glimepiride 4 MG Oral Tab, Take 1 tablet (4 mg total) by mouth before breakfast., Disp: 90 tablet, Rfl: 1    Microlet Lancets Does not apply Misc, USE TO TEST BLOOD SUGAR TWICE DAILY, Disp: 100 each, Rfl: 5    metFORMIN 500 MG Oral Tab, TAKE 2 TABLETS BY MOUTH TWICE DAILY WITH THE MORNING AND EVENING MEAL, Disp: 360 tablet, Rfl: 0    levothyroxine 100 MCG Oral Tab, Take 1 tablet (100 mcg total) by mouth before breakfast., Disp: 90 tablet, Rfl: 1    atorvastatin 40 MG Oral Tab, Take 1 tablet (40 mg total) by mouth nightly., Disp: 90 tablet, Rfl: 1    Dulaglutide (TRULICITY) 1.5 MG/0.5ML Subcutaneous Solution Pen-injector, Inject 1.5 mg into the skin once a week., Disp: 6 mL, Rfl: 0    aspirin 81 MG Oral Tab EC, Take 1  tablet (81 mg total) by mouth daily., Disp: , Rfl:     ALLERGIES:   No Known Allergies    SOCIAL HISTORY:   Social History     Socioeconomic History    Marital status: Single   Tobacco Use    Smoking status: Every Day     Types: Cigarettes    Smokeless tobacco: Never    Tobacco comments:     3 cigs a day    Vaping Use    Vaping status: Never Used   Substance and Sexual Activity    Alcohol use: Yes     Comment: per NG:  Occasionally     Drug use: No   Other Topics Concern    Caffeine Concern Yes     Comment: 2cups coffee, [id number]     PAST MEDICAL HISTORY:   Past Medical History:    High cholesterol    Hyperlipidemia    Type II or unspecified type diabetes mellitus without mention of complication, not stated as uncontrolled     PAST SURGICAL HISTORY:   Past Surgical History:   Procedure Laterality Date      ,,     PHYSICAL EXAM:   Vitals:    24 1335   BP: 121/79   Pulse: 70   Weight: 140 lb (63.5 kg)   Height: 5' 2\" (1.575 m)       BMI:   Body mass index is 25.61 kg/m².    General Appearance:  alert, well developed, in no acute distress  Nutritional:  no extreme weight gain or loss  Head: Atraumatic  Eyes:  normal conjunctivae, sclera., normal sclera and normal pupils  Throat/Neck: normal sound to voice. Normal hearing, normal speech  Back: no kyphosis  Respiratory:  Speaking in full sentences, non-labored. no increased work of breathing, no audible wheezing    Skin:  normal moisture and skin texture, no visible lesions  Hair and nails: normal scalp hair  Hematologic:  no excessive bruising  Neuro: motor grossly intact, moving all extremities without difficulty  Psychiatric:  oriented to time, self, and place  Extremities: no obvious extremity swelling, no lesions    Diabetes Foot Exam:  Bilateral barefoot skin diabetic exam is normal, visualized feet and the appearance is normal.  Bilateral monofilament/sensation of both feet is normal.  Pulsation pedal pulse exam of both lower legs/feet  is normal as well.    LABS: Pertinent labs reviewed    ASSESSMENT/PLAN:    -Reviewed with patient the pathogenesis of diabetes, clinical significance of A1c, and common complications such as: microvascular, macrovascular and diabetic ketoacidosis. Patient verbalizes understanding of the importance of glycemic control and the goals of therapy.   -Discussed with patient glucose targets ranges (Fasting  and post prandial <180).   1.Type 2 Diabetes Mellitus, uncontrolled  -LAB DATA  HbA,C: 7.5% today   a) Medications  -continue with Metformin 1,000mg twice daily   -continue with Glimepiride 4mg once daily   -continue with Farxiga 10mg once daily    -stop Trulicity   -start Ozempic 0.5mg once weekly - Risks and benefits reviewed. Verbalizes understanding.    -reviewed ozempic administration technique in clinic today  -continue to be cautious with low cho diet   -continue to exercise as much as safely able   -reviewed target goal BG readings and A1C  -reviewed when to call clinic and notify me of abnormal BG readings.     b) No nephropathy: GFR: 100 and urine MA: <0.3 on 2024  c) reviewed importance of optho exams- discussed that she checks which MD is covered by her health insurance and notify me. List of optho Mds given to patient today.   D)foot exam: normal today 2024  e) continue testing BG 1x daily - however alternate with fasting and before dinner.   f) Life style changes reviewed     2. Blood Pressure Management   -on lisinopril 2.5mg once daily   -normotensive today     3.Hyperlipidemia   -LDL: 72 and Tri on 2024  -on atorvastatin 40mg at bedtime - verbalizes compliance      4. Hypothyroidism, acquired   -reviewed nonspecific symptoms of thyroid disease  -thyroid function labs normal on 2024  - clinically asymptomatic today  -on levothyroxine 100mcg once daily in AM   -reviewed importance of continuing on taking Levothyroxine      RTC in 3 months   Patient instructed to call sooner  if they develop Blood glucose readings <75 and/or if they have readings persistently >200.     The risks and benefits of my recommendations. Questions were also answered to the best of my knowledge. Patient verbalizes understanding of these issues and agrees to the plan.    5/6/2024  SALLY Gorman

## 2024-05-13 ENCOUNTER — OFFICE VISIT (OUTPATIENT)
Dept: PODIATRY CLINIC | Facility: CLINIC | Age: 59
End: 2024-05-13

## 2024-05-13 DIAGNOSIS — E11.9 TYPE 2 DIABETES MELLITUS WITHOUT COMPLICATION, WITHOUT LONG-TERM CURRENT USE OF INSULIN (HCC): ICD-10-CM

## 2024-05-13 DIAGNOSIS — M20.41 HAMMERTOE OF RIGHT FOOT: Primary | ICD-10-CM

## 2024-05-13 NOTE — PROGRESS NOTES
[facility] Podiatry  Progress Note      Deepa Albert is a 59 year old female.   Chief Complaint   Patient presents with    Diabetic Foot Care     Consult- last AIC= 7.5 on 5/06/2024- FBS this cl=624- R foot pain 6/10 on and off- denies injury- was seen by Dr. Pereira on 8/01/2022             HPI:     Patient is a pleasant 59-year-old diabetic female presents to clinic for bilateral foot evaluation.  She admits that she works prolonged hours at [employer].  Admits to a controlled right second digit which rubs on her shoes causing her pain.    Allergies: Patient has no known allergies.    Current Outpatient Medications   Medication Sig Dispense Refill    semaglutide (OZEMPIC, 0.25 OR 0.5 MG/DOSE,) 2 MG/3ML Subcutaneous Solution Pen-injector Inject 0.5 mg into the skin once a week. 9 mL 0    metFORMIN 500 MG Oral Tab TAKE 2 TABLETS BY MOUTH TWICE DAILY WITH THE MORNING AND EVENING MEAL 360 tablet 0    levothyroxine 100 MCG Oral Tab Take 1 tablet (100 mcg total) by mouth before breakfast. 90 tablet 1    glimepiride 4 MG Oral Tab Take 1 tablet (4 mg total) by mouth before breakfast. 90 tablet 1    dapagliflozin (FARXIGA) 10 MG Oral Tab Take 1 tablet (10 mg total) by mouth daily. 90 tablet 1    atorvastatin 40 MG Oral Tab Take 1 tablet (40 mg total) by mouth nightly. 90 tablet 1    Glucose Blood (ONETOUCH VERIO) In Vitro Strip Test 1x daily 100 strip 1    Blood Glucose Monitoring Suppl (ONETOUCH VERIO) w/Device Does not apply Kit 1 each daily. Test 1x daily 1 kit 0    OneTouch Delica Lancets 33G Does not apply Misc 1 each daily. Test 1x daily 100 each 0    Microlet Lancets Does not apply Misc USE TO TEST BLOOD SUGAR TWICE DAILY 100 each 5    aspirin 81 MG Oral Tab EC Take 1 tablet (81 mg total) by mouth daily.        Past Medical History:    High cholesterol    Hyperlipidemia    Type II or unspecified type diabetes mellitus without mention of complication, not stated as uncontrolled      Past Surgical History:    Procedure Laterality Date      ,,      Family History   Problem Relation Age of Onset    Diabetes Mother     Diabetes Father     Diabetes Sister     Diabetes Brother     Diabetes Brother     Colon Cancer Other         per NG:  Close relative     Glaucoma Neg     Macular degeneration Neg       Social History     Socioeconomic History    Marital status: Single   Tobacco Use    Smoking status: Every Day     Types: Cigarettes    Smokeless tobacco: Never    Tobacco comments:     3 cigs a day    Vaping Use    Vaping status: Never Used   Substance and Sexual Activity    Alcohol use: Yes     Comment: per NG:  Occasionally     Drug use: No   Other Topics Concern    Caffeine Concern Yes     Comment: 2cups coffee, [id number]           REVIEW OF SYSTEMS:     Denies nause, fever, chills  No calf pain  Denies chest pain or SOB      EXAM:   There were no vitals taken for this visit.  GENERAL: well developed, well nourished, in no apparent distress  EXTREMITIES:   1. Integument: Normal skin temperature and turgor  2. Vascular: Dorsalis pedis two out of four bilateral and posterior tibial pulses two out of   four bilateral, capillary refill normal.   3. Musculoskeletal: All muscle groups are graded 5 out of 5 in the foot and ankle.   4. Neurological: Normal sharp dull sensation; reflexes normal.    Bilateral barefoot skin diabetic exam is normal, visualized feet and the appearance is normal.  Bilateral monofilament/sensation of both feet is normal.  Pulsation pedal pulse exam of both lower legs/feet is normal as well.                 ASSESSMENT AND PLAN:   Diagnoses and all orders for this visit:    Hammertoe of right foot    Type 2 diabetes mellitus without complication, without long-term current use of insulin ([facility])        Plan:       -Patient examined, chart history reviewed.  -Discussed importance of proper pedal hygiene, regular foot checks, and tight glucose control.  -Discussed surgical intervention for  the correction of right second digit hammertoe.  Advised patient to schedule surgical consultation if she is interested.  -Ambulate with supportive shoes and inserts and avoid walking barefoot.  -Educated patient on acute signs of infection advised patient to seek immediate medical attention if symptoms arise.      The patient indicates understanding of these issues and agrees to the plan.        Mariela Lind DPM

## 2024-05-29 ENCOUNTER — OFFICE VISIT (OUTPATIENT)
Dept: OPHTHALMOLOGY | Facility: CLINIC | Age: 59
End: 2024-05-29

## 2024-05-29 DIAGNOSIS — E11.9 DIABETES MELLITUS TYPE 2 WITHOUT RETINOPATHY (HCC): Primary | ICD-10-CM

## 2024-05-29 DIAGNOSIS — H25.13 AGE-RELATED NUCLEAR CATARACT OF BOTH EYES: ICD-10-CM

## 2024-05-29 PROCEDURE — 92014 COMPRE OPH EXAM EST PT 1/>: CPT | Performed by: OPHTHALMOLOGY

## 2024-05-29 PROCEDURE — 3072F LOW RISK FOR RETINOPATHY: CPT | Performed by: OPHTHALMOLOGY

## 2024-05-29 PROCEDURE — 2023F DILAT RTA XM W/O RTNOPTHY: CPT | Performed by: OPHTHALMOLOGY

## 2024-05-29 NOTE — PATIENT INSTRUCTIONS
Diabetes mellitus type 2 without retinopathy (HCC)  Diabetes type II: no background of retinopathy, no signs of neovascularization noted.  Discussed ocular and systemic benefits of blood sugar control.  Diagnosis and treatment discussed in detail with patient.    Will see patient in 1 year for a diabetic exam    Age-related nuclear cataract of both eyes  Discussed mild cataracts in both eyes that are not affecting vision and are not surgical at this time.

## 2024-05-29 NOTE — PROGRESS NOTES
Deepa Albert is a 59 year old female.    HPI:     HPI    Patient is here for a diabetic exam.  Patient states her vision is good.    Pt has been a diabetic for 12 years       Pt's diabetes is currently controlled by pills and injectable  Pt checks BS 2 times a day   Pt's last blood sugar was  140 this morning  Last HA1C was 7.5 on 24  Endocrinologist: Brielle ZAVALA    Last edited by Ute Chew OT on 2024 10:37 AM.        Patient History:  Past Medical History:    High cholesterol    Hyperlipidemia    Type II or unspecified type diabetes mellitus without mention of complication, not stated as uncontrolled       Surgical History: Deepa Albert has a past surgical history that includes  (,,).    Family History   Problem Relation Age of Onset    Diabetes Mother     Diabetes Father     Diabetes Sister     Diabetes Brother     Diabetes Brother     Colon Cancer Other         per NG:  Close relative     Glaucoma Neg     Macular degeneration Neg        Social History:   Social History     Socioeconomic History    Marital status: Single   Tobacco Use    Smoking status: Every Day     Types: Cigarettes    Smokeless tobacco: Never    Tobacco comments:     3 cigs a day    Vaping Use    Vaping status: Never Used   Substance and Sexual Activity    Alcohol use: Yes     Comment: per NG:  Occasionally     Drug use: No   Other Topics Concern    Caffeine Concern Yes     Comment: 2cups coffee, [id number]       Medications:  Current Outpatient Medications   Medication Sig Dispense Refill    semaglutide (OZEMPIC, 0.25 OR 0.5 MG/DOSE,) 2 MG/3ML Subcutaneous Solution Pen-injector Inject 0.5 mg into the skin once a week. 9 mL 0    metFORMIN 500 MG Oral Tab TAKE 2 TABLETS BY MOUTH TWICE DAILY WITH THE MORNING AND EVENING MEAL 360 tablet 0    levothyroxine 100 MCG Oral Tab Take 1 tablet (100 mcg total) by mouth before breakfast. 90 tablet 1    glimepiride 4 MG Oral Tab Take 1 tablet (4 mg total) by mouth before  breakfast. 90 tablet 1    dapagliflozin (FARXIGA) 10 MG Oral Tab Take 1 tablet (10 mg total) by mouth daily. 90 tablet 1    atorvastatin 40 MG Oral Tab Take 1 tablet (40 mg total) by mouth nightly. 90 tablet 1    Glucose Blood (ONETOUCH VERIO) In Vitro Strip Test 1x daily 100 strip 1    Blood Glucose Monitoring Suppl (ONETOUCH VERIO) w/Device Does not apply Kit 1 each daily. Test 1x daily 1 kit 0    OneTouch Delica Lancets 33G Does not apply Misc 1 each daily. Test 1x daily 100 each 0    Microlet Lancets Does not apply Misc USE TO TEST BLOOD SUGAR TWICE DAILY 100 each 5    aspirin 81 MG Oral Tab EC Take 1 tablet (81 mg total) by mouth daily.         Allergies:  No Known Allergies    ROS:       PHYSICAL EXAM:     Base Eye Exam       Visual Acuity (Snellen - Linear)         Right Left    Dist sc 20/25 -2 20/20    Near cc 20/30 20/30      Correction: Glasses              Tonometry (Icare, 10:50 AM)         Right Left    Pressure 18 18              Pupils         Pupils    Right PERRL    Left PERRL              Visual Fields         Left Right     Full Full              Extraocular Movement         Right Left     Full, Ortho Full, Ortho              Neuro/Psych       Oriented x3: Yes    Mood/Affect: Normal              Dilation       Both eyes: 1.0% Mydriacyl and 2.5% Louis Synephrine @ 10:50 AM                  Slit Lamp and Fundus Exam       External Exam         Right Left    External Normal Normal              Slit Lamp Exam         Right Left    Lids/Lashes Dermatochalasis, Meibomian gland dysfunction Dermatochalasis, Meibomian gland dysfunction    Conjunctiva/Sclera Nasal/temp pinguecula Nasal/temp pinguecula    Cornea Clear Clear    Anterior Chamber Deep and quiet Deep and quiet    Iris Normal Normal    Lens 1+ Nuclear sclerosis 1+ Nuclear sclerosis    Vitreous Clear Clear              Fundus Exam         Right Left    Disc Good rim, Temporal crescent Good rim, Temporal crescent    C/D Ratio 0.2 0.2    Macula  Normal No BDR Normal No BDR    Vessels Normal Normal    Periphery Normal Normal                  Refraction       Wearing Rx         Sphere Cylinder    Right +2.25 Sphere    Left +2.25 Sphere      Type: Reading only              Manifest Refraction         Sphere Cylinder    Right +2.25 Sphere    Left +2.25 Sphere   Patient is happy with reading glasses only and would just like a little stronger power for her next pair.             Final Rx         Sphere Cylinder Near VA    Right +2.50 Sphere 20/20    Left +2.50 Sphere 20/20      Type: Reading only                     ASSESSMENT/PLAN:     Diagnoses and Plan:     Diabetes mellitus type 2 without retinopathy (HCC)  Diabetes type II: no background of retinopathy, no signs of neovascularization noted.  Discussed ocular and systemic benefits of blood sugar control.  Diagnosis and treatment discussed in detail with patient.    Will see patient in 1 year for a diabetic exam    Age-related nuclear cataract of both eyes  Discussed mild cataracts in both eyes that are not affecting vision and are not surgical at this time.          No orders of the defined types were placed in this encounter.      Meds This Visit:  Requested Prescriptions      No prescriptions requested or ordered in this encounter        Follow up instructions:  Return in about 1 year (around 5/29/2025) for Diabetic eye exam.    5/29/2024  Scribed by: Niko Garnica MD

## 2024-05-29 NOTE — ASSESSMENT & PLAN NOTE
Discussed mild cataracts in both eyes that are not affecting vision and are not surgical at this time.

## 2024-08-05 ENCOUNTER — OFFICE VISIT (OUTPATIENT)
Dept: ENDOCRINOLOGY CLINIC | Facility: CLINIC | Age: 59
End: 2024-08-05

## 2024-08-05 VITALS
BODY MASS INDEX: 25.71 KG/M2 | DIASTOLIC BLOOD PRESSURE: 75 MMHG | WEIGHT: 136.19 LBS | HEIGHT: 61 IN | SYSTOLIC BLOOD PRESSURE: 113 MMHG | HEART RATE: 87 BPM

## 2024-08-05 DIAGNOSIS — E11.65 TYPE 2 DIABETES MELLITUS WITH HYPERGLYCEMIA, WITHOUT LONG-TERM CURRENT USE OF INSULIN (HCC): Primary | ICD-10-CM

## 2024-08-05 LAB
GLUCOSE BLOOD: 174
HEMOGLOBIN A1C: 7.4 % (ref 4.3–5.6)
TEST STRIP LOT #: NORMAL NUMERIC

## 2024-08-05 PROCEDURE — 3074F SYST BP LT 130 MM HG: CPT | Performed by: NURSE PRACTITIONER

## 2024-08-05 PROCEDURE — 83036 HEMOGLOBIN GLYCOSYLATED A1C: CPT | Performed by: NURSE PRACTITIONER

## 2024-08-05 PROCEDURE — 3078F DIAST BP <80 MM HG: CPT | Performed by: NURSE PRACTITIONER

## 2024-08-05 PROCEDURE — 82947 ASSAY GLUCOSE BLOOD QUANT: CPT | Performed by: NURSE PRACTITIONER

## 2024-08-05 PROCEDURE — 3051F HG A1C>EQUAL 7.0%<8.0%: CPT | Performed by: NURSE PRACTITIONER

## 2024-08-05 PROCEDURE — 99214 OFFICE O/P EST MOD 30 MIN: CPT | Performed by: NURSE PRACTITIONER

## 2024-08-05 PROCEDURE — 3008F BODY MASS INDEX DOCD: CPT | Performed by: NURSE PRACTITIONER

## 2024-08-05 RX ORDER — POLYETHYLENE GLYCOL 3350 17 G/17G
17 POWDER, FOR SOLUTION ORAL
Qty: 30 EACH | Refills: 1 | Status: SHIPPED | OUTPATIENT
Start: 2024-08-05

## 2024-08-05 RX ORDER — DULAGLUTIDE 1.5 MG/.5ML
1.5 INJECTION, SOLUTION SUBCUTANEOUS WEEKLY
Qty: 6 ML | Refills: 0 | Status: SHIPPED | OUTPATIENT
Start: 2024-08-05

## 2024-08-05 RX ORDER — POLYETHYLENE GLYCOL 3350 17 G/17G
17 POWDER, FOR SOLUTION ORAL DAILY
Qty: 30 EACH | Refills: 1 | Status: SHIPPED | OUTPATIENT
Start: 2024-08-05 | End: 2024-08-05

## 2024-08-05 NOTE — PATIENT INSTRUCTIONS
A1C: 7.4% today --> previously was 7.5% on 5/6/2024  Blood glucose: 174 in clinic today    Medications:   -continue with Metformin 1,000mg twice daily   -continue with Glimepiride 4mg once daily   -continue with Farxiga 10mg once daily      - may take Myralax 1 package daily as needed for constipation   --> please drink 60-80oz of water per day to help medication work best     In 1-2 weeks- if feeling better with stomach related symptoms  Start Trulicity 1.5mg once weekly       Weight:  Wt Readings from Last 6 Encounters:   08/05/24 136 lb 3.2 oz (61.8 kg)   05/06/24 140 lb (63.5 kg)   10/10/23 140 lb 6.4 oz (63.7 kg)   09/06/23 140 lb (63.5 kg)   05/25/23 143 lb (64.9 kg)   01/04/23 141 lb (64 kg)     A1C goal:  <7.0%    Blood sugar testing:  Test your blood sugar 1 times daily   Recommended times to test: Before breakfast (fasting) or after dinner     Blood sugar targets:  Before breakfast:   (preferably < 110)  2 hours after meals: <150     Call for persistent blood sugars < 75 or > 200

## 2024-08-05 NOTE — PROGRESS NOTES
Name: Deepa Albert  Date: 8/5/2024    CHIEF COMPLAINT   Chief Complaint   Patient presents with    Diabetes     Pt is here for follow up for diabetes and A1c check     HISTORY OF PRESENT ILLNESS   Deepa Albert is a 59 year old female who presents for follow up on diabetes management.   HbA1C: 7.4% at POC today. Previously was 7.5% on 5/6/2024.   Blood glucose is: 174 in clinic today.   Patient notes that she has been having abd pain, nausea, and constipation since starting Ozempic. She stopped taking around 2 weeks ago due to these symptoms. Since stopping her symptoms have improved, however not completely resolved.   Continues to follow a low carb diet and staying active per usual.     DIABETES HISTORY  Diagnosed: around 2012  Prior HbA, C or glycohemoglobin were 8.2% 5/2014; 7.2% 8/2014; 7.7% 11/2014; 8.4% 2/2015; 8.1% 5/2015; 6.5% 11/2015; 6.6% 2/2016; 6.7% 5/2016; 7.1% 9/2016; 7.2% 12/2016; 6.7% 4/2017; 7.2% 10/2017; 7.5% 4/2018; 7.5% 7/2018; 7.4% 4/2019; 7.8% 7/2019; 8.0% 1/2020; 7.7% 8/2020; 7.7% 12/2020; 7.5% 3/2021; 7.3% 2/23/2022; 7.6% 2/23/2022; 6.7% 6/29/2022; 7.1% 1/4/2023; 7.0% 9/2023; 7.5% 5.6/22024; 7.4% at POC Today;     PREVIOUS MEDICATION FOR DM:  -Ozempic- d/c'e due to abd pain   - Trulicity -d/c'ed due to supply shortage     CURRENT MEDICATIONS FOR DM:  Metformin 1,000mg twice daily   Glimepiride 4mg once daily   Farxiga 10mg once daily in AM   Ozempic 0.5mg once weekly - has been having upset stomach; abd pain; nausea and constipation; stopped around 2 weeks ago due to symptoms     HOME GLUCOSE READINGS:   Testing BG x 1 daily  Meter or log book today: no  Fasting average: 90 - 120s- 130s   2hrs after dinner: 160s-180s  Hypoglycemia present: no -  2 episodes since LOV- usually occurring if breakfast snack is postponed while at work. Has been treating with kind bar if she feels symptoms     HISTORY OF DIABETES COMPLICATIONS:  History of Retinopathy: no  - last eye exam within the last 12  months: no - last exam was on 5/29/2024 with Dr. Garnica   History of Neuropathy: no   History of Nephropathy: no     ASSOCIATED COMPLICATIONS:   HTN: no   Hyperlipidemia: yes   Coronary Artery Disease: no   Cerebrovascular Disease: no  Peripheral Vascular Disease: no      DIETARY COMPLIANCE:  Good compliance to low carb diet.     EXERCISE:   yes- stays active at work and also walks during her lunch break for 30 mins - works at [employer].     Polyuria, polyphagia, polydipsia: no  Paresthesias: no  Blurred vision: no  Recent steroids, illness or infections: no    REVIEW OF SYSTEMS  Constitutional: Negative for: weight change, fever, fatigue, cold/heat intolerance  Eyes: Negative for:  Visual changes, proptosis, blurring  ENT: Negative for:  dysphagia, neck swelling, dysphonia  Respiratory: Negative for: hemoptysis, shortness of breath, cough, or dyspnea.  Cardiovascular: Negative for:  chest pain, chest discomfort, palpitations  GI: Negative for:  abdominal pain, nausea, vomiting, diarrhea, heartburn, constipation  Neurology: Negative for: headache, dizziness, syncope, numbness/tingling, or weakness.   Genito-Urinary: Negative for: dysuria, frequency or hematuria   Hematology/Lymphatics: Negative for: bruising, easy bleeding, lower extremity edema  Endocrine: Negative for: polyuria, polydipsia. No thyroid disease. No osteoporosis.   Skin: Negative for: rash, blister, infection or ulcers.    MEDICATIONS:     Current Outpatient Medications:     semaglutide (OZEMPIC, 0.25 OR 0.5 MG/DOSE,) 2 MG/3ML Subcutaneous Solution Pen-injector, Inject 0.5 mg into the skin once a week., Disp: 9 mL, Rfl: 0    metFORMIN 500 MG Oral Tab, TAKE 2 TABLETS BY MOUTH TWICE DAILY WITH THE MORNING AND EVENING MEAL, Disp: 360 tablet, Rfl: 0    levothyroxine 100 MCG Oral Tab, Take 1 tablet (100 mcg total) by mouth before breakfast., Disp: 90 tablet, Rfl: 1    glimepiride 4 MG Oral Tab, Take 1 tablet (4 mg total) by mouth before breakfast., Disp:  90 tablet, Rfl: 1    dapagliflozin (FARXIGA) 10 MG Oral Tab, Take 1 tablet (10 mg total) by mouth daily., Disp: 90 tablet, Rfl: 1    atorvastatin 40 MG Oral Tab, Take 1 tablet (40 mg total) by mouth nightly., Disp: 90 tablet, Rfl: 1    Glucose Blood (ONETOUCH VERIO) In Vitro Strip, Test 1x daily, Disp: 100 strip, Rfl: 1    Blood Glucose Monitoring Suppl (ONETOUCH VERIO) w/Device Does not apply Kit, 1 each daily. Test 1x daily, Disp: 1 kit, Rfl: 0    OneTouch Delica Lancets 33G Does not apply Misc, 1 each daily. Test 1x daily, Disp: 100 each, Rfl: 0    Microlet Lancets Does not apply Misc, USE TO TEST BLOOD SUGAR TWICE DAILY, Disp: 100 each, Rfl: 5    aspirin 81 MG Oral Tab EC, Take 1 tablet (81 mg total) by mouth daily., Disp: , Rfl:     ALLERGIES:   No Known Allergies    SOCIAL HISTORY:   Social History     Socioeconomic History    Marital status: Single   Tobacco Use    Smoking status: Every Day     Types: Cigarettes    Smokeless tobacco: Never    Tobacco comments:     3 cigs a day    Vaping Use    Vaping status: Never Used   Substance and Sexual Activity    Alcohol use: Yes     Comment: per NG:  Occasionally     Drug use: No   Other Topics Concern    Caffeine Concern Yes     Comment: 2cups coffee, [id number]     PAST MEDICAL HISTORY:   Past Medical History:    High cholesterol    Hyperlipidemia    Type II or unspecified type diabetes mellitus without mention of complication, not stated as uncontrolled     PAST SURGICAL HISTORY:   Past Surgical History:   Procedure Laterality Date      ,,     PHYSICAL EXAM:   Vitals:    24 0855   BP: 113/75   Pulse: 87   Weight: 136 lb 3.2 oz (61.8 kg)   Height: 5' 1\" (1.549 m)       BMI:   Body mass index is 25.73 kg/m².    General Appearance:  alert, well developed, in no acute distress  Nutritional:  no extreme weight gain or loss  Head: Atraumatic  Eyes:  normal conjunctivae, sclera., normal sclera and normal pupils  Throat/Neck: normal sound to voice.  Normal hearing, normal speech  Back: no kyphosis  Respiratory:  Speaking in full sentences, non-labored. no increased work of breathing, no audible wheezing    Skin:  normal moisture and skin texture, no visible lesions  Hair and nails: normal scalp hair  Hematologic:  no excessive bruising  Neuro: motor grossly intact, moving all extremities without difficulty  Psychiatric:  oriented to time, self, and place  Extremities: no obvious extremity swelling, no lesions    LABS: Pertinent labs reviewed    ASSESSMENT/PLAN:    -Reviewed with patient the pathogenesis of diabetes, clinical significance of A1c, and common complications such as: microvascular, macrovascular and diabetic ketoacidosis. Patient verbalizes understanding of the importance of glycemic control and the goals of therapy.   -Discussed with patient glucose targets ranges (Fasting  and post prandial <180).   1.Type 2 Diabetes Mellitus, uncontrolled  -LAB DATA  HbA,C: 7.4% today   a) Medications  -continue with Metformin 1,000mg twice daily   -continue with Glimepiride 4mg once daily   -continue with Farxiga 10mg once daily    Stop ozempic   - may take Myralax once daily as needed for constipation symptoms. Educated to drink 60-80oz of water while taking this to help medication work best.     - if stomach related/constipation symptoms resolve, ok to start Trulicity 1.5mg once weekly after 1-2 weeks.     - reviewed option of mounjaro, however given that this brand has also had supply shortage issues, unsure that it would help with original supply issue with trulicity. Given that patient tolerated this brand the best, will try to resume same brand/dose.   -continue to be cautious with low cho diet   -continue to exercise as much as safely able   -reviewed target goal BG readings and A1C  -reviewed when to call clinic and notify me of abnormal BG readings.     b) No nephropathy: GFR: 100 and urine MA: <0.3 on 2/14/2024  c) UTD with optho - last exam was  2024 with Dr. Garnica   D)foot exam: normal on 2024  e) continue testing BG 1x daily - however alternate with fasting and before dinner.   f) Life style changes reviewed     2. Blood Pressure Management   -on lisinopril 2.5mg once daily   -normotensive today     3.Hyperlipidemia   -LDL: 72 and Tri on 2024  -on atorvastatin 40mg at bedtime - verbalizes compliance      4. Hypothyroidism, acquired   -reviewed nonspecific symptoms of thyroid disease  -thyroid function labs normal on 2024  - clinically asymptomatic today  -on levothyroxine 100mcg once daily in AM   -reviewed importance of continuing on taking Levothyroxine      RTC in 3 months   Patient instructed to call sooner if they develop Blood glucose readings <75 and/or if they have readings persistently >200.     The risks and benefits of my recommendations. Questions were also answered to the best of my knowledge. Patient verbalizes understanding of these issues and agrees to the plan.    2024  SALLY Gorman

## 2024-08-06 NOTE — TELEPHONE ENCOUNTER
Endocrine Refill protocol for metformin    Protocol Criteria:  PASSED    -Appointment with Endocrinology completed in the last 6 months or scheduled in the next 3 months    -GFR greater than or equal to 40 in the past 12 months     -A1c result below 8.5% in the past 6 months      Verify the above has been completed or scheduled in the appropriate timeline. If so can send a 90 day supply with 1 refill.     Last completed office visit:8/5/2024 Lorrie Hannah APRN   Next scheduled Follow up: 11/11/24      Last GFR result:   100    Last A1c result:7.4

## 2024-10-25 DIAGNOSIS — Z12.11 SCREENING FOR COLON CANCER: Primary | ICD-10-CM

## 2024-10-28 DIAGNOSIS — E03.9 ACQUIRED HYPOTHYROIDISM: ICD-10-CM

## 2024-10-29 RX ORDER — LEVOTHYROXINE SODIUM 100 UG/1
100 TABLET ORAL
Qty: 90 TABLET | Refills: 1 | Status: SHIPPED | OUTPATIENT
Start: 2024-10-29

## 2024-10-29 RX ORDER — GLIMEPIRIDE 4 MG/1
4 TABLET ORAL
Qty: 90 TABLET | Refills: 1 | Status: SHIPPED | OUTPATIENT
Start: 2024-10-29

## 2024-10-29 NOTE — TELEPHONE ENCOUNTER
Endocrine refill protocol for medications for hypothyroidism and hyperthyroidism    Protocol Criteria:  PASSED Reason: N/A    If all below requirements are met, send a 90-day supply with 1 refill per provider protocol.    Verify appointment with Endocrinology completed in the last 12 months or scheduled in the next 6 months.    Normal TSH result in the past 12 months   Review recent telephone encounters and mychart communications with patient to ensure a dose change has not occurred since last office visit that was not updated in the medication history list     Last completed office visit:8/5/2024 Lorrie Hannah APRN   Next scheduled Follow up:   Future Appointments   Date Time Provider Department Center   11/11/2024 10:45 AM Lorrie Hannah APRN [facility]      Last TSH result:   TSH   Date Value Ref Range Status   02/14/2024 0.895 0.550 - 4.780 mIU/mL Final     TSH (S)   Date Value Ref Range Status   11/05/2014 3.12 0.34 - 5.60 uIU/mL Final

## 2024-10-30 RX ORDER — DAPAGLIFLOZIN 10 MG/1
10 TABLET, FILM COATED ORAL DAILY
Qty: 90 TABLET | Refills: 1 | Status: SHIPPED | OUTPATIENT
Start: 2024-10-30

## 2024-10-30 NOTE — TELEPHONE ENCOUNTER
Endocrine Refill protocol for oral and injectable diabetic medications    Protocol Criteria:  PASSED  Reason: N/A    If all below requirements are met, send a 90-day supply with 1 refill per provider protocol.    Verify appointment with Endocrinology completed in the last 6 months or scheduled in the next 3 months.  Verify A1C has been completed within the last 6 months and is below 8.5%     Last completed office visit: 8/5/2024 Lorrie Hannah APRN   Next scheduled Follow up:   Future Appointments   Date Time Provider Department Center   11/11/2024 10:45 AM Lorrie Hannah APRN [facility]      Last A1c result: Last A1c value was 7.4% done 8/5/2024.

## 2024-11-01 RX ORDER — LANCETS 33 GAUGE
1 EACH MISCELLANEOUS DAILY
Qty: 100 EACH | Refills: 1 | Status: SHIPPED | OUTPATIENT
Start: 2024-11-01 | End: 2025-01-04

## 2024-11-01 NOTE — TELEPHONE ENCOUNTER
Endocrine Refill protocol for Glucose testing supplies     Protocol Criteria: PASSED Reason: N/A    If below requirement is met, send a 90-day supply with 1 refill per provider protocol.    Verify appointment with Endocrinology completed in the last 6 months or scheduled in the next 3 months.    Last completed office visit: 8/5/2024 Lorrie Hannah APRN   Next scheduled Follow up:   Future Appointments   Date Time Provider Department Center   11/11/2024 10:45 AM Lorrie Hannah APRN [facility]

## 2024-11-11 ENCOUNTER — OFFICE VISIT (OUTPATIENT)
Dept: ENDOCRINOLOGY CLINIC | Facility: CLINIC | Age: 59
End: 2024-11-11

## 2024-11-11 VITALS
WEIGHT: 136.13 LBS | BODY MASS INDEX: 25.7 KG/M2 | HEIGHT: 61 IN | SYSTOLIC BLOOD PRESSURE: 118 MMHG | DIASTOLIC BLOOD PRESSURE: 76 MMHG | HEART RATE: 101 BPM

## 2024-11-11 DIAGNOSIS — E11.65 TYPE 2 DIABETES MELLITUS WITH HYPERGLYCEMIA, WITHOUT LONG-TERM CURRENT USE OF INSULIN (HCC): Primary | ICD-10-CM

## 2024-11-11 DIAGNOSIS — E78.5 HYPERLIPIDEMIA, UNSPECIFIED HYPERLIPIDEMIA TYPE: ICD-10-CM

## 2024-11-11 DIAGNOSIS — E03.9 ACQUIRED HYPOTHYROIDISM: ICD-10-CM

## 2024-11-11 LAB
GLUCOSE BLOOD: 173
HEMOGLOBIN A1C: 7.4 % (ref 4.3–5.6)
TEST STRIP LOT #: NORMAL NUMERIC

## 2024-11-11 PROCEDURE — 99214 OFFICE O/P EST MOD 30 MIN: CPT | Performed by: NURSE PRACTITIONER

## 2024-11-11 PROCEDURE — 3074F SYST BP LT 130 MM HG: CPT | Performed by: NURSE PRACTITIONER

## 2024-11-11 PROCEDURE — 3008F BODY MASS INDEX DOCD: CPT | Performed by: NURSE PRACTITIONER

## 2024-11-11 PROCEDURE — 82947 ASSAY GLUCOSE BLOOD QUANT: CPT | Performed by: NURSE PRACTITIONER

## 2024-11-11 PROCEDURE — 83036 HEMOGLOBIN GLYCOSYLATED A1C: CPT | Performed by: NURSE PRACTITIONER

## 2024-11-11 PROCEDURE — 3078F DIAST BP <80 MM HG: CPT | Performed by: NURSE PRACTITIONER

## 2024-11-11 PROCEDURE — 3051F HG A1C>EQUAL 7.0%<8.0%: CPT | Performed by: NURSE PRACTITIONER

## 2024-11-11 NOTE — PATIENT INSTRUCTIONS
A1C: 7.4% today --> previously was 7.4% on 8/5/2024  Blood glucose: 173 in clinic today    Medications:   - continue with Metformin 1,000mg twice daily   - continue with Glimepiride 4mg once daily   - continue with Farxiga 10mg once daily in AM   - continue with Trulicity 1.5mg once weekly until home supply is finished   --> star taking probiotic and stool softner (docusate/colace) as needed on Wednesday, Thursday, Friday    --> if symptoms not better in the next 3 weeks, stop taking trulicity after home supply is finished, then call 2 weeks after to give me an update on your glucose readings.         Weight:  Wt Readings from Last 6 Encounters:   11/11/24 136 lb 1.6 oz (61.7 kg)   08/05/24 136 lb 3.2 oz (61.8 kg)   05/06/24 140 lb (63.5 kg)   10/10/23 140 lb 6.4 oz (63.7 kg)   09/06/23 140 lb (63.5 kg)   05/25/23 143 lb (64.9 kg)     A1C goal:  <7.0%    Blood sugar testing:  Test your blood sugar 1-2 times daily   Recommended times to test: Before breakfast (fasting) or 2hrs after meals     Blood sugar targets:  Before breakfast:  (preferably < 110)  2 hours after meals: <150    Call for persistent blood sugars < 75 or > 200

## 2024-11-11 NOTE — PROGRESS NOTES
Name: Deepa Albert  Date: 11/11/2024    CHIEF COMPLAINT   Chief Complaint   Patient presents with    Diabetes     Pt is here for follow up for diabetes and A1c check      HISTORY OF PRESENT ILLNESS   Deepa Albert is a 59 year old female who presents for follow up on diabetes management.   HbA1C: 7.4% at POC today. Previously was 7.4% on 8/5/2024  Blood glucose is: 173 in clinic today.      DIABETES HISTORY  Diagnosed: around 2012  Prior HbA, C or glycohemoglobin were 8.2% 5/2014; 7.2% 8/2014; 7.7% 11/2014; 8.4% 2/2015; 8.1% 5/2015; 6.5% 11/2015; 6.6% 2/2016; 6.7% 5/2016; 7.1% 9/2016; 7.2% 12/2016; 6.7% 4/2017; 7.2% 10/2017; 7.5% 4/2018; 7.5% 7/2018; 7.4% 4/2019; 7.8% 7/2019; 8.0% 1/2020; 7.7% 8/2020; 7.7% 12/2020; 7.5% 3/2021; 7.3% 2/23/2022; 7.6% 2/23/2022; 6.7% 6/29/2022; 7.1% 1/4/2023; 7.0% 9/2023; 7.5% 5.6/22024; 7.4% 8/5/2024; 7.4% at POC today;     PREVIOUS MEDICATION FOR DM:  -Ozempic- d/c'e due to abd pain     CURRENT MEDICATIONS FOR DM:  Metformin 1,000mg twice daily   Glimepiride 4mg once daily   Farxiga 10mg once daily in AM   Trulicity 1.5mg once weekly - has been having symptoms of abd pain, bloating and constipation for 2 days after injection day.     HOME GLUCOSE READINGS:   Testing BG x 1 daily  Meter or log book today: no  Fasting average: 90s-110s   2hrs after dinner: 160s-180s  Hypoglycemia present: no -  2 episodes since LOV- usually occurring if breakfast snack is postponed while at work. Has been treating with kind bar if she feels symptoms     HISTORY OF DIABETES COMPLICATIONS:  History of Retinopathy: no  - last eye exam within the last 12 months: no - last exam was on 5/29/2024 with Dr. Garnica   History of Neuropathy: no   History of Nephropathy: no     ASSOCIATED COMPLICATIONS:   HTN: no   Hyperlipidemia: yes   Coronary Artery Disease: no   Cerebrovascular Disease: no  Peripheral Vascular Disease: no      DIETARY COMPLIANCE:  Good compliance to low carb diet.     EXERCISE:   yes- stays  active at work and also walks during her lunch break for 30 mins - works at [employer].     Polyuria, polyphagia, polydipsia: no  Paresthesias: no  Blurred vision: no  Recent steroids, illness or infections: no    REVIEW OF SYSTEMS  Constitutional: Negative for: weight change, fever, fatigue, cold/heat intolerance  Eyes: Negative for:  Visual changes, proptosis, blurring  ENT: Negative for:  dysphagia, neck swelling, dysphonia  Respiratory: Negative for: hemoptysis, shortness of breath, cough, or dyspnea.  Cardiovascular: Negative for:  chest pain, chest discomfort, palpitations  GI: Negative for:  abdominal pain, nausea, vomiting, diarrhea, heartburn, constipation  Neurology: Negative for: headache, dizziness, syncope, numbness/tingling, or weakness.   Genito-Urinary: Negative for: dysuria, frequency or hematuria   Hematology/Lymphatics: Negative for: bruising, easy bleeding, lower extremity edema  Endocrine: Negative for: polyuria, polydipsia. No thyroid disease. No osteoporosis.   Skin: Negative for: rash, blister, infection or ulcers.    MEDICATIONS:     Current Outpatient Medications:     Lancets (ONETOUCH DELICA PLUS [id number]) Does not apply Misc, TEST ONCE DAILY, Disp: 100 each, Rfl: 1    FARXIGA 10 MG Oral Tab, TAKE 1 TABLET(10 MG) BY MOUTH DAILY, Disp: 90 tablet, Rfl: 1    LEVOTHYROXINE 100 MCG Oral Tab, TAKE 1 TABLET(100 MCG) BY MOUTH BEFORE BREAKFAST, Disp: 90 tablet, Rfl: 1    GLIMEPIRIDE 4 MG Oral Tab, TAKE 1 TABLET(4 MG) BY MOUTH BEFORE BREAKFAST, Disp: 90 tablet, Rfl: 1    METFORMIN 500 MG Oral Tab, TAKE 2 TABLETS BY MOUTH TWICE DAILY WITH THE MORNING AND EVENING MEAL, Disp: 360 tablet, Rfl: 1    Polyethylene Glycol 3350 (MIRALAX) 17 g Oral Powd Pack, Take 17 g by mouth daily as needed (constipation)., Disp: 30 each, Rfl: 1    Dulaglutide (TRULICITY) 1.5 MG/0.5ML Subcutaneous Solution Pen-injector, Inject 1.5 mg into the skin once a week., Disp: 6 mL, Rfl: 0    atorvastatin 40 MG Oral Tab, Take 1 tablet  (40 mg total) by mouth nightly., Disp: 90 tablet, Rfl: 1    Glucose Blood (ONETOUCH VERIO) In Vitro Strip, Test 1x daily, Disp: 100 strip, Rfl: 1    Blood Glucose Monitoring Suppl (ONETOUCH VERIO) w/Device Does not apply Kit, 1 each daily. Test 1x daily, Disp: 1 kit, Rfl: 0    Microlet Lancets Does not apply Misc, USE TO TEST BLOOD SUGAR TWICE DAILY, Disp: 100 each, Rfl: 5    aspirin 81 MG Oral Tab EC, Take 1 tablet (81 mg total) by mouth daily., Disp: , Rfl:     ALLERGIES:   No Known Allergies    SOCIAL HISTORY:   Social History     Socioeconomic History    Marital status: Single   Tobacco Use    Smoking status: Every Day     Types: Cigarettes    Smokeless tobacco: Never    Tobacco comments:     3 cigs a day    Vaping Use    Vaping status: Never Used   Substance and Sexual Activity    Alcohol use: Yes     Comment: per NG:  Occasionally     Drug use: No   Other Topics Concern    Caffeine Concern Yes     Comment: 2cups coffee, [id number]     PAST MEDICAL HISTORY:   Past Medical History:    High cholesterol    Hyperlipidemia    Type II or unspecified type diabetes mellitus without mention of complication, not stated as uncontrolled     PAST SURGICAL HISTORY:   Past Surgical History:   Procedure Laterality Date      ,,     PHYSICAL EXAM:   Vitals:    24 1041   BP: 118/76   Pulse: 101   Weight: 136 lb 1.6 oz (61.7 kg)   Height: 5' 1\" (1.549 m)       BMI:   Body mass index is 25.72 kg/m².    General Appearance:  alert, well developed, in no acute distress  Nutritional:  no extreme weight gain or loss  Head: Atraumatic  Eyes:  normal conjunctivae, sclera., normal sclera and normal pupils  Throat/Neck: normal sound to voice. Normal hearing, normal speech  Back: no kyphosis  Respiratory:  Speaking in full sentences, non-labored. no increased work of breathing, no audible wheezing    Skin:  normal moisture and skin texture, no visible lesions  Hair and nails: normal scalp hair  Hematologic:  no  excessive bruising  Neuro: motor grossly intact, moving all extremities without difficulty  Psychiatric:  oriented to time, self, and place  Extremities: no obvious extremity swelling, no lesions    LABS: Pertinent labs reviewed    ASSESSMENT/PLAN:    -Reviewed with patient the pathogenesis of diabetes, clinical significance of A1c, and common complications such as: microvascular, macrovascular and diabetic ketoacidosis. Patient verbalizes understanding of the importance of glycemic control and the goals of therapy.   -Discussed with patient glucose targets ranges (Fasting  and post prandial <180).   1.Type 2 Diabetes Mellitus, uncontrolled  -LAB DATA  HbA,C: 7.4% today   a) Medications  -continue with Metformin 1,000mg twice daily   - continue with Glimepiride 4mg once daily   -continue with Farxiga 10mg once daily     continue with Trulicity 1.5mg once weekly until home supply is finished   --> start taking probiotic and stool softner (docusate/colace) as needed on Wednesday, Thursday, Friday    --> if symptoms not better in the next 3 weeks, stop taking trulicity after home supply is finished, then call 2 weeks after to give me an update on your glucose readings.     - reviewed option of transitioning to mounjaro or januvia or lower dose of trulicity 0.75mg dose if she continues to have GI intolerance issues  -continue to be cautious with low cho diet   -continue to exercise as much as safely able   -reviewed target goal BG readings and A1C  -reviewed when to call clinic and notify me of abnormal BG readings.     b) No nephropathy: GFR: 100 and urine MA: <0.3 on 2/14/2024 --> repeat labs   c) UTD with optho - last exam was 5/2024 with Dr. Garnica   D)foot exam: normal on 5/6/2024  e) continue testing BG 1x daily - however alternate with fasting and before dinner.   f) Life style changes reviewed     2. Blood Pressure Management   -on lisinopril 2.5mg once daily   -normotensive today     3.Hyperlipidemia    -LDL: 72 and Tri on 2024  -on atorvastatin 40mg at bedtime - verbalizes compliance   - repeat labs      4. Hypothyroidism, acquired   -reviewed nonspecific symptoms of thyroid disease  -thyroid function labs normal on 2024  - clinically asymptomatic today  -on levothyroxine 100mcg once daily in AM   -repeat thyroid labs       RTC in 3 months   Patient instructed to call sooner if they develop Blood glucose readings <75 and/or if they have readings persistently >200.     The risks and benefits of my recommendations. Questions were also answered to the best of my knowledge. Patient verbalizes understanding of these issues and agrees to the plan.    2024  SALLY Gorman

## 2025-01-04 RX ORDER — LANCETS 33 GAUGE
1 EACH MISCELLANEOUS DAILY
Qty: 100 EACH | Refills: 1 | Status: SHIPPED | OUTPATIENT
Start: 2025-01-04

## 2025-01-04 NOTE — TELEPHONE ENCOUNTER
Endocrine Refill protocol for Glucose testing supplies     Protocol Criteria: PASSED     If below requirement is met, send a 90-day supply with 1 refill per provider protocol.    Verify appointment with Endocrinology completed in the last 6 months or scheduled in the next 3 months.    Last completed office visit: 11/11/2024 Lorrie Hannah APRN   Next scheduled Follow up:   Future Appointments   Date Time Provider Department Center   2/17/2025  9:15 AM Lorrie Hannah APRN [facility]

## 2025-01-09 ENCOUNTER — TELEPHONE (OUTPATIENT)
Dept: ENDOCRINOLOGY CLINIC | Facility: CLINIC | Age: 60
End: 2025-01-09

## 2025-01-09 NOTE — TELEPHONE ENCOUNTER
Mary --     Pt called [facility] office, front office spoke to RN. RN called back patient with .     Spoke to pt. Pt states she finished her trulicity 1.5 mg 2 weeks ago and was told by you to call and give us a BG update. After finishing trulicity, states her sugars have been higher. Before they were in the 120's but now it's 170-180 and higher.     Denies any acute illness, steroids. Eats at different times during the day due to work. Breakfast is usually oatmeal with bananas or toast. Lunch/dinner is tortillas, beans, rice.     Reports the following sugars:    Fasting 2 hrs after dinner   1/9 166    1/8 164 230       Current regimen:   Metformin 1000 mg BID -> confirmed   Glimepiride 4mg daily -> confirmed  Farxiga 10 mg daily -> confirmed  Trulicity 1.5mg weekly -- stopped 2 weeks ago

## 2025-01-09 NOTE — TELEPHONE ENCOUNTER
Update noted. Since her glucose readings are higher, we need to adjust her medications.      Please ask patient to increase glimepiride 4--> 6mg once daily and give me an update on her glucose readings next week if not improved.       Thank you!

## 2025-01-13 ENCOUNTER — LAB ENCOUNTER (OUTPATIENT)
Dept: LAB | Age: 60
End: 2025-01-13
Attending: FAMILY MEDICINE
Payer: COMMERCIAL

## 2025-01-13 ENCOUNTER — HOSPITAL ENCOUNTER (OUTPATIENT)
Dept: GENERAL RADIOLOGY | Age: 60
Discharge: HOME | End: 2025-01-13
Attending: FAMILY MEDICINE
Payer: COMMERCIAL

## 2025-01-13 ENCOUNTER — OFFICE VISIT (OUTPATIENT)
Dept: FAMILY MEDICINE CLINIC | Facility: CLINIC | Age: 60
End: 2025-01-13

## 2025-01-13 VITALS
HEIGHT: 61 IN | SYSTOLIC BLOOD PRESSURE: 127 MMHG | HEART RATE: 84 BPM | WEIGHT: 138 LBS | DIASTOLIC BLOOD PRESSURE: 76 MMHG | BODY MASS INDEX: 26.06 KG/M2

## 2025-01-13 DIAGNOSIS — M54.50 LEFT LUMBAR PAIN: ICD-10-CM

## 2025-01-13 DIAGNOSIS — M25.552 LEFT HIP PAIN: ICD-10-CM

## 2025-01-13 DIAGNOSIS — M25.552 LEFT HIP PAIN: Primary | ICD-10-CM

## 2025-01-13 DIAGNOSIS — E11.9 TYPE 2 DIABETES MELLITUS WITHOUT COMPLICATION, UNSPECIFIED WHETHER LONG TERM INSULIN USE (HCC): ICD-10-CM

## 2025-01-13 LAB
BILIRUB UR QL: NEGATIVE
CLARITY UR: CLEAR
COLOR UR: COLORLESS
GLUCOSE UR-MCNC: >1000 MG/DL
HGB UR QL STRIP.AUTO: NEGATIVE
KETONES UR-MCNC: NEGATIVE MG/DL
LEUKOCYTE ESTERASE UR QL STRIP.AUTO: NEGATIVE
NITRITE UR QL STRIP.AUTO: NEGATIVE
PH UR: 5.5 [PH] (ref 5–8)
PROT UR-MCNC: NEGATIVE MG/DL
SP GR UR STRIP: 1.02 (ref 1–1.03)
UROBILINOGEN UR STRIP-ACNC: NORMAL

## 2025-01-13 PROCEDURE — 99213 OFFICE O/P EST LOW 20 MIN: CPT | Performed by: FAMILY MEDICINE

## 2025-01-13 PROCEDURE — 3074F SYST BP LT 130 MM HG: CPT | Performed by: FAMILY MEDICINE

## 2025-01-13 PROCEDURE — 72100 X-RAY EXAM L-S SPINE 2/3 VWS: CPT | Performed by: FAMILY MEDICINE

## 2025-01-13 PROCEDURE — 73502 X-RAY EXAM HIP UNI 2-3 VIEWS: CPT | Performed by: FAMILY MEDICINE

## 2025-01-13 PROCEDURE — 3008F BODY MASS INDEX DOCD: CPT | Performed by: FAMILY MEDICINE

## 2025-01-13 PROCEDURE — 3078F DIAST BP <80 MM HG: CPT | Performed by: FAMILY MEDICINE

## 2025-01-13 PROCEDURE — 81003 URINALYSIS AUTO W/O SCOPE: CPT

## 2025-01-13 RX ORDER — IBUPROFEN 400 MG/1
400 TABLET, FILM COATED ORAL EVERY 6 HOURS PRN
Qty: 90 TABLET | Refills: 0 | Status: SHIPPED | OUTPATIENT
Start: 2025-01-13

## 2025-01-13 NOTE — PROGRESS NOTES
Blood pressure 127/76, pulse 84, height 5' 1\" (1.549 m), weight 138 lb (62.6 kg), not currently breastfeeding.          Presents today complaining of low back pain for a week.  Left-sided radiates down the left thigh.  No injuries.  No nocturnal pain.  No bowel or bladder dysfunction.  Has worked at [employer] for 22 years.  [place]  Gets relief with Advil 400 mg in the morning and then at night.    Denies bowel or bladder dysfunction.  Denies lower abdominal pain.  No melena no hematochezia.  Has eaten and had a normal bowel movement today.  No fever or vomiting.  Objective  Abdomen is soft nontender nondistended positive bowel sounds no tenderness at McBurney's point  L4-S1 motor intact bilaterally    Bilateral barefoot skin diabetic exam is normal, visualized feet and the appearance is normal.  Bilateral monofilament/sensation of both feet is normal.  Pulsation pedal pulse exam of both lower legs/feet is normal as well.      Negative straight leg raise bilaterally    Hips with negative FADIR testing bilaterally    Negative DELFINA testing bilaterally    Back without spinous process or paraspinous tenderness in the lumbar area    Assessment low back pain    Plan continue ibuprofen for now prescription sent    X-rays ordered    Urinalysis micro    Will follow with results

## 2025-02-09 ENCOUNTER — APPOINTMENT (OUTPATIENT)
Dept: LAB | Facility: HOSPITAL | Age: 60
End: 2025-02-09
Attending: FAMILY MEDICINE
Payer: COMMERCIAL

## 2025-02-09 PROCEDURE — 82274 ASSAY TEST FOR BLOOD FECAL: CPT

## 2025-02-10 ENCOUNTER — LAB ENCOUNTER (OUTPATIENT)
Dept: LAB | Age: 60
End: 2025-02-10
Attending: NURSE PRACTITIONER
Payer: COMMERCIAL

## 2025-02-10 DIAGNOSIS — E03.9 ACQUIRED HYPOTHYROIDISM: ICD-10-CM

## 2025-02-10 DIAGNOSIS — E11.65 TYPE 2 DIABETES MELLITUS WITH HYPERGLYCEMIA, WITHOUT LONG-TERM CURRENT USE OF INSULIN (HCC): ICD-10-CM

## 2025-02-10 DIAGNOSIS — Z12.11 SCREENING FOR COLON CANCER: ICD-10-CM

## 2025-02-10 LAB
ALBUMIN SERPL-MCNC: 4.7 G/DL (ref 3.2–4.8)
ALBUMIN/GLOB SERPL: 2 {RATIO} (ref 1–2)
ALP LIVER SERPL-CCNC: 65 U/L
ALT SERPL-CCNC: 21 U/L
ANION GAP SERPL CALC-SCNC: 8 MMOL/L (ref 0–18)
AST SERPL-CCNC: 17 U/L (ref ?–34)
BILIRUB SERPL-MCNC: 0.5 MG/DL (ref 0.3–1.2)
BUN BLD-MCNC: 22 MG/DL (ref 9–23)
BUN/CREAT SERPL: 30.1 (ref 10–20)
CALCIUM BLD-MCNC: 9.3 MG/DL (ref 8.7–10.4)
CHLORIDE SERPL-SCNC: 107 MMOL/L (ref 98–112)
CHOLEST SERPL-MCNC: 207 MG/DL (ref ?–200)
CO2 SERPL-SCNC: 27 MMOL/L (ref 21–32)
CREAT BLD-MCNC: 0.73 MG/DL
CREAT UR-SCNC: 69.8 MG/DL
EGFRCR SERPLBLD CKD-EPI 2021: 95 ML/MIN/1.73M2 (ref 60–?)
FASTING PATIENT LIPID ANSWER: YES
FASTING STATUS PATIENT QL REPORTED: YES
GLOBULIN PLAS-MCNC: 2.4 G/DL (ref 2–3.5)
GLUCOSE BLD-MCNC: 138 MG/DL (ref 70–99)
HDLC SERPL-MCNC: 36 MG/DL (ref 40–59)
HEMOCCULT STL QL: NEGATIVE
LDLC SERPL CALC-MCNC: 142 MG/DL (ref ?–100)
MICROALBUMIN UR-MCNC: <0.3 MG/DL
NONHDLC SERPL-MCNC: 171 MG/DL (ref ?–130)
OSMOLALITY SERPL CALC.SUM OF ELEC: 300 MOSM/KG (ref 275–295)
POTASSIUM SERPL-SCNC: 4.4 MMOL/L (ref 3.5–5.1)
PROT SERPL-MCNC: 7.1 G/DL (ref 5.7–8.2)
SODIUM SERPL-SCNC: 142 MMOL/L (ref 136–145)
T4 FREE SERPL-MCNC: 1.1 NG/DL (ref 0.8–1.7)
TRIGL SERPL-MCNC: 161 MG/DL (ref 30–149)
TSI SER-ACNC: 1.86 UIU/ML (ref 0.55–4.78)
VLDLC SERPL CALC-MCNC: 30 MG/DL (ref 0–30)

## 2025-02-10 PROCEDURE — 84439 ASSAY OF FREE THYROXINE: CPT

## 2025-02-10 PROCEDURE — 36415 COLL VENOUS BLD VENIPUNCTURE: CPT

## 2025-02-10 PROCEDURE — 82570 ASSAY OF URINE CREATININE: CPT

## 2025-02-10 PROCEDURE — 84443 ASSAY THYROID STIM HORMONE: CPT

## 2025-02-10 PROCEDURE — 80053 COMPREHEN METABOLIC PANEL: CPT

## 2025-02-10 PROCEDURE — 80061 LIPID PANEL: CPT

## 2025-02-10 PROCEDURE — 82043 UR ALBUMIN QUANTITATIVE: CPT

## 2025-02-17 ENCOUNTER — OFFICE VISIT (OUTPATIENT)
Dept: ENDOCRINOLOGY CLINIC | Facility: CLINIC | Age: 60
End: 2025-02-17

## 2025-02-17 VITALS
SYSTOLIC BLOOD PRESSURE: 125 MMHG | HEART RATE: 74 BPM | HEIGHT: 61 IN | DIASTOLIC BLOOD PRESSURE: 72 MMHG | BODY MASS INDEX: 25.86 KG/M2 | WEIGHT: 137 LBS

## 2025-02-17 DIAGNOSIS — E78.5 HYPERLIPIDEMIA, UNSPECIFIED HYPERLIPIDEMIA TYPE: ICD-10-CM

## 2025-02-17 DIAGNOSIS — E11.65 TYPE 2 DIABETES MELLITUS WITH HYPERGLYCEMIA, WITHOUT LONG-TERM CURRENT USE OF INSULIN (HCC): Primary | ICD-10-CM

## 2025-02-17 LAB
GLUCOSE BLOOD: 215
HEMOGLOBIN A1C: 7.5 % (ref 4.3–5.6)
TEST STRIP LOT #: NORMAL NUMERIC

## 2025-02-17 RX ORDER — ROSUVASTATIN CALCIUM 40 MG/1
40 TABLET, COATED ORAL NIGHTLY
Qty: 90 TABLET | Refills: 1 | Status: SHIPPED | OUTPATIENT
Start: 2025-02-17

## 2025-02-17 NOTE — PATIENT INSTRUCTIONS
A1C: 7.5% today --> previously was 7.4% on 11/11/2024  Blood glucose: 215 in clinic today    Medications:   - continue with Metformin 1,000mg twice daily   - continue with Glimepiride 4mg once daily   - continue with Farxiga 10mg once daily in AM   - start Januvia 100mg once daily in the morning   - stop Atorvastatin   - start Rosuvastatin 40mg nightly       - please give me an update on your blood glucose readings in 2 weeks       Weight:  Wt Readings from Last 6 Encounters:   02/17/25 137 lb (62.1 kg)   01/13/25 138 lb (62.6 kg)   11/11/24 136 lb 1.6 oz (61.7 kg)   08/05/24 136 lb 3.2 oz (61.8 kg)   05/06/24 140 lb (63.5 kg)   10/10/23 140 lb 6.4 oz (63.7 kg)     A1C goal:  <7.0%    Blood sugar testing:  Test your blood sugar 1 time daily   Recommended times to test: alternate with before breakfast (fasting) or 2hrs after meals     Blood sugar targets:  Before breakfast:  (preferably < 110)  Before meals: <150   2 hours after meals: <180 (preferably <150)     Call for persistent blood sugars < 75 or > 200

## 2025-02-17 NOTE — PROGRESS NOTES
Name: Deepa Albert  Date: 2/17/2025    CHIEF COMPLAINT   Chief Complaint   Patient presents with    Diabetes     F/u     HISTORY OF PRESENT ILLNESS   Deepa Albert is a 59 year old female who presents for follow up on diabetes management.   HbA1C: 7.5% at POC Today. Previously was 7.4% on 11/11/2024.   Blood glucose is: 215 in clinic today.    Patient notes that she has been feeling well and denies any health changes or taking new medications since last office visit. She continues to follow a low carb diet and staying active per usual. She has been compliant with taking all diabetes medications.     DIABETES HISTORY  Diagnosed: around 2012  Prior HbA, C or glycohemoglobin were 8.2% 5/2014; 7.2% 8/2014; 7.7% 11/2014; 8.4% 2/2015; 8.1% 5/2015; 6.5% 11/2015; 6.6% 2/2016; 6.7% 5/2016; 7.1% 9/2016; 7.2% 12/2016; 6.7% 4/2017; 7.2% 10/2017; 7.5% 4/2018; 7.5% 7/2018; 7.4% 4/2019; 7.8% 7/2019; 8.0% 1/2020; 7.7% 8/2020; 7.7% 12/2020; 7.5% 3/2021; 7.3% 2/23/2022; 7.6% 2/23/2022; 6.7% 6/29/2022; 7.1% 1/4/2023; 7.0% 9/2023; 7.5% 5.6/22024; 7.4% 8/5/2024; 7.4% 11/11/2024; 7.5% at POC today;     PREVIOUS MEDICATION FOR DM:  - Ozempic- d/c'e due to abd pain   - Trulicity - d/c'ed due to abd pain, bloating, constipation symptoms     CURRENT MEDICATIONS FOR DM:  Metformin 1,000mg twice daily   Glimepiride 6mg once daily -- has been taking 4mg daily (did not hear voicemail left on 1/10/2025 with dose change)   Farxiga 10mg once daily in AM     HOME GLUCOSE READINGS:   Testing BG x 1 daily  Meter or log book today: no  Fasting average: 147; 210; in the past recent 2 weeks 120s-160s   2hrs after dinner: low to mid 200s   Hypoglycemia present: no     HISTORY OF DIABETES COMPLICATIONS:  History of Retinopathy: no  - last eye exam within the last 12 months: no - last exam was on 5/29/2024 with Dr. Garnica   History of Neuropathy: no   History of Nephropathy: no     ASSOCIATED COMPLICATIONS:   HTN: no   Hyperlipidemia: yes   Coronary  Artery Disease: no   Cerebrovascular Disease: no  Peripheral Vascular Disease: no      DIETARY COMPLIANCE:  Fair; denies changes to diet   - occ. eating fruit   - diet coke occasionally   - avoids juices or diet soda     EXERCISE:   yes- stays active at work with walking - works at [employer]    Polyuria, polyphagia, polydipsia: no  Paresthesias: no  Blurred vision: no  Recent steroids, illness or infections: no    REVIEW OF SYSTEMS  Constitutional: Negative for: weight change, fever, fatigue, cold/heat intolerance  Eyes: Negative for:  Visual changes, proptosis, blurring  ENT: Negative for:  dysphagia, neck swelling, dysphonia  Respiratory: Negative for: hemoptysis, shortness of breath, cough, or dyspnea.  Cardiovascular: Negative for:  chest pain, chest discomfort, palpitations  GI: Negative for:  abdominal pain, nausea, vomiting, diarrhea, heartburn, constipation  Neurology: Negative for: headache, dizziness, syncope, numbness/tingling, or weakness.   Genito-Urinary: Negative for: dysuria, frequency or hematuria   Hematology/Lymphatics: Negative for: bruising, easy bleeding, lower extremity edema  Endocrine: Negative for: polyuria, polydipsia. No thyroid disease. No osteoporosis.   Skin: Negative for: rash, blister, infection or ulcers.    MEDICATIONS:     Current Outpatient Medications:     FARXIGA 10 MG Oral Tab, TAKE 1 TABLET(10 MG) BY MOUTH DAILY, Disp: 90 tablet, Rfl: 1    GLIMEPIRIDE 4 MG Oral Tab, TAKE 1 TABLET(4 MG) BY MOUTH BEFORE BREAKFAST, Disp: 90 tablet, Rfl: 1    METFORMIN 500 MG Oral Tab, TAKE 2 TABLETS BY MOUTH TWICE DAILY WITH THE MORNING AND EVENING MEAL, Disp: 360 tablet, Rfl: 1    Glucose Blood (ONETOUCH VERIO) In Vitro Strip, Test 1x daily, Disp: 100 strip, Rfl: 1    ibuprofen 400 MG Oral Tab, Take 1 tablet (400 mg total) by mouth every 6 (six) hours as needed for Pain., Disp: 90 tablet, Rfl: 0    ONETOUCH DELICA PLUS [id number] Does not apply Misc, TEST ONCE DAILY, Disp: 100 each, Rfl: 1     LEVOTHYROXINE 100 MCG Oral Tab, TAKE 1 TABLET(100 MCG) BY MOUTH BEFORE BREAKFAST, Disp: 90 tablet, Rfl: 1    Dulaglutide (TRULICITY) 1.5 MG/0.5ML Subcutaneous Solution Pen-injector, Inject 1.5 mg into the skin once a week. (Patient not taking: Reported on 2025), Disp: 6 mL, Rfl: 0    atorvastatin 40 MG Oral Tab, Take 1 tablet (40 mg total) by mouth nightly., Disp: 90 tablet, Rfl: 1    Blood Glucose Monitoring Suppl (ONETOUCH VERIO) w/Device Does not apply Kit, 1 each daily. Test 1x daily, Disp: 1 kit, Rfl: 0    Microlet Lancets Does not apply Misc, USE TO TEST BLOOD SUGAR TWICE DAILY, Disp: 100 each, Rfl: 5    aspirin 81 MG Oral Tab EC, Take 1 tablet (81 mg total) by mouth daily., Disp: , Rfl:     ALLERGIES:   No Known Allergies    SOCIAL HISTORY:   Social History     Socioeconomic History    Marital status: Single   Tobacco Use    Smoking status: Every Day     Types: Cigarettes    Smokeless tobacco: Never    Tobacco comments:     3 cigs a day    Vaping Use    Vaping status: Never Used   Substance and Sexual Activity    Alcohol use: Yes     Comment: per NG:  Occasionally     Drug use: No   Other Topics Concern    Caffeine Concern Yes     Comment: 2cups coffee, [id number]     PAST MEDICAL HISTORY:   Past Medical History:    High cholesterol    Hyperlipidemia    Type II or unspecified type diabetes mellitus without mention of complication, not stated as uncontrolled     PAST SURGICAL HISTORY:   Past Surgical History:   Procedure Laterality Date      ,,     PHYSICAL EXAM:   Vitals:    25 0848   BP: 125/72   Pulse: 74   Weight: 137 lb (62.1 kg)   Height: 5' 1\" (1.549 m)       BMI:   Body mass index is 25.89 kg/m².    General Appearance:  alert, well developed, in no acute distress  Nutritional:  no extreme weight gain or loss  Head: Atraumatic  Eyes:  normal conjunctivae, sclera., normal sclera and normal pupils  Throat/Neck: normal sound to voice. Normal hearing, normal speech  Back: no  kyphosis  Respiratory:  Speaking in full sentences, non-labored. no increased work of breathing, no audible wheezing    Skin:  normal moisture and skin texture, no visible lesions  Hair and nails: normal scalp hair  Hematologic:  no excessive bruising  Neuro: motor grossly intact, moving all extremities without difficulty  Psychiatric:  oriented to time, self, and place  Extremities: no obvious extremity swelling, no lesions    LABS: Pertinent labs reviewed    ASSESSMENT/PLAN:    -Reviewed with patient the pathogenesis of diabetes, clinical significance of A1c, and common complications such as: microvascular, macrovascular and diabetic ketoacidosis. Patient verbalizes understanding of the importance of glycemic control and the goals of therapy.   -Discussed with patient glucose targets ranges (Fasting  and post prandial <180).   1.Type 2 Diabetes Mellitus, uncontrolled  -LAB DATA  HbA,C: 7.5% today   a) Medications  -continue with Metformin 1,000mg twice daily   -continue with Glimepiride 4mg once daily   -continue with Farxiga 10mg once daily    - start Januvia 100mg once daily in the morning - Risks and benefits reviewed. Verbalizes understanding.    - discussed that she gives me an update on her glucose readings in 2 weeks  -continue to be cautious with low cho diet   -continue to exercise as much as safely able   -reviewed target goal BG readings and A1C  -reviewed when to call clinic and notify me of abnormal BG readings.     b) No nephropathy: GFR: 95 and urine MA: <0.3 on 2/10/2025  c) UTD with optho - last exam was 2024 with Dr. Garnica --> encouraged that she schedules an apt with Dr. Singh   D)foot exam: normal with Dr. Hauser on 2025  e) continue testing BG 1x daily - however alternate with fasting and before dinner.   f) Life style changes reviewed     2. Blood Pressure Management   -on lisinopril 2.5mg once daily   -normotensive today     3.Hyperlipidemia   -LDL: 142 and Tri on  2/10/2025  - on atorvastatin 40mg at bedtime - has been taking inconsistently   - stop atorvastatin   - start rosuvastatin 40mg nightly - Risks and benefits reviewed. Verbalizes understanding.       4. Hypothyroidism, acquired   -reviewed nonspecific symptoms of thyroid disease  -thyroid function labs normal on 2/10/2025  - clinically asymptomatic today  -on levothyroxine 100mcg once daily in AM       RTC in 3 months   Patient instructed to call sooner if they develop Blood glucose readings <75 and/or if they have readings persistently >200.     The risks and benefits of my recommendations. Questions were also answered to the best of my knowledge. Patient verbalizes understanding of these issues and agrees to the plan.    2/17/2025  SALLY Gorman

## 2025-02-27 RX ORDER — LANCETS
EACH MISCELLANEOUS
Qty: 100 EACH | Refills: 5 | OUTPATIENT
Start: 2025-02-27

## 2025-02-27 NOTE — TELEPHONE ENCOUNTER
Outpatient Medication Detail     Disp Refills Start End    ONETOUCH DELICA PLUS [id number] Does not apply Misc 100 each 1 1/4/2025 --    Sig - Route: TEST ONCE DAILY - In Vitro    Sent to pharmacy as: OneTouch Delica Plus [id number]    E-Prescribing Status: Receipt confirmed by pharmacy (1/4/2025 10:40 AM CST)      Pharmacy    [id number] - [place] - [address] AT [facility] [place] [address], [phone], [phone]

## 2025-03-06 ENCOUNTER — TELEPHONE (OUTPATIENT)
Dept: ENDOCRINOLOGY CLINIC | Facility: CLINIC | Age: 60
End: 2025-03-06

## 2025-03-06 ENCOUNTER — PATIENT MESSAGE (OUTPATIENT)
Dept: ENDOCRINOLOGY CLINIC | Facility: CLINIC | Age: 60
End: 2025-03-06

## 2025-03-08 NOTE — TELEPHONE ENCOUNTER
This call is addressing TE from 1/9/25.  Since then, patient was already seen by APRN 2/17/25 and addressed concern.     Will still contact the patient to get an update on BG since APRN would like an update on BG 2 weeks post visit.       DM meds per LOV:   Metformin 1000 mg BID  Glimepiride 4 mg once a day  Farxiga 10 mg QAM  Januvia 100 mg QAM    Called pt with  on the line: Chris [phone]  No answer.   left message to call back on Monday.

## 2025-04-04 ENCOUNTER — TELEPHONE (OUTPATIENT)
Dept: ENDOCRINOLOGY CLINIC | Facility: CLINIC | Age: 60
End: 2025-04-04

## 2025-04-04 NOTE — TELEPHONE ENCOUNTER
SPEEDY Hernandez    Patient checking BG once daily.   Ranging Fasting 120-150   4/4 117    DM Med Regimen  MTF 1000 mg BID  Glimepiride 4 mg dasily  Farxiga 10 mg daily   Januvia 100 mg     Patient states that BG have been well controlled. Patient states she has been struggling with constipation. Advised pt to increase water intake and fiber intake. Also advised to increase physical activity. Patient states she has tried metamucil in the past and will try again. Confirmed appt 5/19.

## 2025-04-04 NOTE — TELEPHONE ENCOUNTER
Patient has not returned call or mychart messages.   Left message to call back.   Letter sent via [employer] and via regular mail.

## 2025-04-04 NOTE — TELEPHONE ENCOUNTER
Patient returning call and indicates her glucose range stays at 130-150 with medication. Patient also says her stomach is still bothering her. Please call at [phone],thanks.   *see closed telephone encounter 3/6, patient says she is unable to view her [employer] messages.

## 2025-04-07 NOTE — TELEPHONE ENCOUNTER
Update noted. It seem that her glucose readings are more stable now.     Let's proceed with keeping DM medications the same for now.     Agree with recommendation of increasing PO water intake and veggies with fiber.     Please also instruct patient to alternate testing times with 2hrs after meal is complete. Keep record of this so we can review at next f/u visit.     Thank you!

## 2025-04-24 RX ORDER — GLIMEPIRIDE 4 MG/1
4 TABLET ORAL
Qty: 90 TABLET | Refills: 1 | Status: SHIPPED | OUTPATIENT
Start: 2025-04-24

## 2025-04-24 NOTE — TELEPHONE ENCOUNTER
Endocrine Refill protocol for oral and injectable diabetic medications    Protocol Criteria:  PASSED  Reason: N/A    If all below requirements are met, send a 90-day supply with 1 refill per provider protocol.    Verify appointment with Endocrinology completed in the last 6 months or scheduled in the next 3 months.  Verify A1C has been completed within the last 6 months and is below 8.5%     Last completed office visit: 2/17/2025 Lorrie Hannah APRN   Next scheduled Follow up:   Future Appointments   Date Time Provider Department Center   5/19/2025  9:15 AM Lorrie Hannah APRN [facility]      Last A1c result: Last A1c value was 7.5% done 2/17/2025.

## 2025-04-25 RX ORDER — DAPAGLIFLOZIN 10 MG/1
10 TABLET, FILM COATED ORAL DAILY
Qty: 90 TABLET | Refills: 1 | Status: SHIPPED | OUTPATIENT
Start: 2025-04-25

## 2025-05-19 ENCOUNTER — OFFICE VISIT (OUTPATIENT)
Dept: ENDOCRINOLOGY CLINIC | Facility: CLINIC | Age: 60
End: 2025-05-19

## 2025-05-19 VITALS
WEIGHT: 135.38 LBS | HEART RATE: 98 BPM | SYSTOLIC BLOOD PRESSURE: 114 MMHG | BODY MASS INDEX: 25.56 KG/M2 | RESPIRATION RATE: 16 BRPM | HEIGHT: 61 IN | DIASTOLIC BLOOD PRESSURE: 71 MMHG

## 2025-05-19 DIAGNOSIS — E03.9 ACQUIRED HYPOTHYROIDISM: ICD-10-CM

## 2025-05-19 DIAGNOSIS — E78.5 HYPERLIPIDEMIA, UNSPECIFIED HYPERLIPIDEMIA TYPE: ICD-10-CM

## 2025-05-19 DIAGNOSIS — E11.65 TYPE 2 DIABETES MELLITUS WITH HYPERGLYCEMIA, WITHOUT LONG-TERM CURRENT USE OF INSULIN (HCC): Primary | ICD-10-CM

## 2025-05-19 LAB
GLUCOSE BLOOD: 251
HEMOGLOBIN A1C: 7.8 % (ref 4.3–5.6)
TEST STRIP LOT #: NORMAL NUMERIC

## 2025-05-19 PROCEDURE — 3078F DIAST BP <80 MM HG: CPT | Performed by: NURSE PRACTITIONER

## 2025-05-19 PROCEDURE — 3051F HG A1C>EQUAL 7.0%<8.0%: CPT | Performed by: NURSE PRACTITIONER

## 2025-05-19 PROCEDURE — 3074F SYST BP LT 130 MM HG: CPT | Performed by: NURSE PRACTITIONER

## 2025-05-19 PROCEDURE — 99214 OFFICE O/P EST MOD 30 MIN: CPT | Performed by: NURSE PRACTITIONER

## 2025-05-19 PROCEDURE — 3008F BODY MASS INDEX DOCD: CPT | Performed by: NURSE PRACTITIONER

## 2025-05-19 PROCEDURE — 82947 ASSAY GLUCOSE BLOOD QUANT: CPT | Performed by: NURSE PRACTITIONER

## 2025-05-19 PROCEDURE — 83036 HEMOGLOBIN GLYCOSYLATED A1C: CPT | Performed by: NURSE PRACTITIONER

## 2025-05-19 RX ORDER — LEVOTHYROXINE SODIUM 100 UG/1
100 TABLET ORAL
Qty: 90 TABLET | Refills: 3 | Status: SHIPPED | OUTPATIENT
Start: 2025-05-19

## 2025-05-19 RX ORDER — GLIPIZIDE 5 MG/1
5 TABLET ORAL
Qty: 180 TABLET | Refills: 0 | Status: SHIPPED | OUTPATIENT
Start: 2025-05-19 | End: 2025-08-17

## 2025-05-19 NOTE — PATIENT INSTRUCTIONS
A1C: 7.8% today --> previously was 7.5% on 2/17/2025  Blood glucose: 251 in clinic today    Medications:   -continue with Metformin 1,000mg twice daily   - stop Glimepiride   - start Glipizide     5mg before breakfast     5mg before dinner      --> Take 1/2 tablet if eating a lighter meal   -continue with Farxiga 10mg once daily    - continue with Januvia 100mg once daily in the morning       - repeat cholesterol lab before next follow up visit - fast for 10hrs before       Weight:  Wt Readings from Last 6 Encounters:   05/19/25 135 lb 6.4 oz (61.4 kg)   02/17/25 137 lb (62.1 kg)   01/13/25 138 lb (62.6 kg)   11/11/24 136 lb 1.6 oz (61.7 kg)   08/05/24 136 lb 3.2 oz (61.8 kg)   05/06/24 140 lb (63.5 kg)     1C goal:  <7.0%    Blood sugar testing:  Test your blood sugar 1 time daily   Recommended times to test: alternate with before breakfast (fasting) or before lunch or before dinner or bedtime     Blood sugar targets:  Before breakfast:  (preferably < 110)  Before meals: <150   2 hours after meals: <180 (preferably <150)     Call for persistent blood sugars < 75 or > 200

## 2025-07-08 NOTE — TELEPHONE ENCOUNTER
Endocrine Refill protocol for metformin    Protocol Criteria:  PASSED  Reason: N/A    If all below requirements are met, send a 90-day supply with 1 refill per provider protocol.     Verify appointment with Endocrinology completed in the last 6 months or scheduled in the next 3 months.  Verify A1C has been completed within the last 6 months and is below 8.5%  Verify last GFR is greater than or equal to 40 in the past 12 months    Last completed office visit:Visit date not found   Last completed telemed visit: Visit date not found  Next scheduled Follow up:   Future Appointments   Date Time Provider Department Center   8/25/2025  9:15 AM Lorrie Hannah APRN [facility]       Last GFR result:    Lab Results   Component Value Date    EGFRCR 95 02/10/2025     Last A1c result: Last A1C result: 7.8% done 5/19/2025.

## 2025-07-14 RX ORDER — ROSUVASTATIN CALCIUM 40 MG/1
40 TABLET, COATED ORAL NIGHTLY
Qty: 90 TABLET | Refills: 1 | Status: SHIPPED | OUTPATIENT
Start: 2025-07-14

## 2025-07-14 RX ORDER — SITAGLIPTIN 100 MG/1
100 TABLET, FILM COATED ORAL DAILY
Qty: 90 TABLET | Refills: 1 | Status: SHIPPED | OUTPATIENT
Start: 2025-07-14

## 2025-07-14 NOTE — TELEPHONE ENCOUNTER
Endocrine refill protocol for lipid lowering medications    Protocol Criteria:  FAILED Reason: Abnormal labs    If all below requirements are met, send a 90-day supply with 1 refill per provider protocol.    Verify appointment with Endocrinology completed in the last 6 months or scheduled in the next 3 months.  Lipid panel must have been completed in the last 12 months   ALT result below 80  LDL result below 130    Last completed office visit:5/19/2025 Lorrie Hannah APRN   Last completed telemed visit: Visit date not found  Next scheduled Follow up:   Future Appointments   Date Time Provider Department Center   8/25/2025  9:15 AM Lorrie Hannah APRN [facility]      Last Lipid panel date: Cholesterol: 207, done on 2/10/2025.  HDL Cholesterol: 36, done on 2/10/2025.  TriGlycerides 161, done on 2/10/2025.  LDL Cholesterol: 142, done on 2/10/2025.     Last ALT result: Last ALT was 21 done on 2/10/2025.  Last AST was 17 done on 2/10/2025.

## 2025-07-14 NOTE — TELEPHONE ENCOUNTER
Endocrine Refill protocol for oral and injectable diabetic medications    Protocol Criteria:  PASSED  Reason: N/A    If all below requirements are met, send a 90-day supply with 1 refill per provider protocol.    Verify appointment with Endocrinology completed in the last 6 months or scheduled in the next 3 months.  Verify A1C has been completed within the last 6 months and is below 8.5%     Last completed office visit: 5/19/2025 Lorrie Hannah APRN   Next scheduled Follow up:   Future Appointments   Date Time Provider Department Center   8/25/2025  9:15 AM Lorrie Hannah APRN [facility]      Last A1c result: Last A1c value was 7.8% done 5/19/2025.

## 2025-07-21 RX ORDER — GLIPIZIDE 5 MG/1
5 TABLET ORAL
Qty: 180 TABLET | Refills: 0 | Status: SHIPPED | OUTPATIENT
Start: 2025-07-21

## 2025-07-31 DIAGNOSIS — E11.9 TYPE 2 DIABETES MELLITUS WITHOUT COMPLICATION, UNSPECIFIED WHETHER LONG TERM INSULIN USE (HCC): ICD-10-CM

## 2025-08-01 RX ORDER — BLOOD SUGAR DIAGNOSTIC
1 STRIP MISCELLANEOUS DAILY
Qty: 100 STRIP | Refills: 1 | OUTPATIENT
Start: 2025-08-01

## 2025-08-18 ENCOUNTER — LAB ENCOUNTER (OUTPATIENT)
Dept: LAB | Age: 60
End: 2025-08-18
Attending: NURSE PRACTITIONER

## 2025-08-18 DIAGNOSIS — E78.5 HYPERLIPIDEMIA, UNSPECIFIED HYPERLIPIDEMIA TYPE: ICD-10-CM

## 2025-08-18 LAB
CHOLEST SERPL-MCNC: 120 MG/DL (ref ?–200)
FASTING PATIENT LIPID ANSWER: NO
HDLC SERPL-MCNC: 44 MG/DL (ref 40–59)
LDLC SERPL CALC-MCNC: 48 MG/DL (ref ?–100)
NONHDLC SERPL-MCNC: 76 MG/DL (ref ?–130)
TRIGL SERPL-MCNC: 167 MG/DL (ref 30–149)
VLDLC SERPL CALC-MCNC: 24 MG/DL (ref 0–30)

## 2025-08-18 PROCEDURE — 80061 LIPID PANEL: CPT

## 2025-08-18 PROCEDURE — 36415 COLL VENOUS BLD VENIPUNCTURE: CPT

## 2025-08-25 ENCOUNTER — OFFICE VISIT (OUTPATIENT)
Dept: ENDOCRINOLOGY CLINIC | Facility: CLINIC | Age: 60
End: 2025-08-25

## 2025-08-25 VITALS
WEIGHT: 134 LBS | BODY MASS INDEX: 25.3 KG/M2 | HEIGHT: 61 IN | SYSTOLIC BLOOD PRESSURE: 107 MMHG | RESPIRATION RATE: 18 BRPM | HEART RATE: 93 BPM | DIASTOLIC BLOOD PRESSURE: 71 MMHG

## 2025-08-25 DIAGNOSIS — E11.9 TYPE 2 DIABETES MELLITUS WITHOUT COMPLICATION, UNSPECIFIED WHETHER LONG TERM INSULIN USE (HCC): ICD-10-CM

## 2025-08-25 DIAGNOSIS — E11.65 TYPE 2 DIABETES MELLITUS WITH HYPERGLYCEMIA, WITHOUT LONG-TERM CURRENT USE OF INSULIN (HCC): Primary | ICD-10-CM

## 2025-08-25 LAB
GLUCOSE BLOOD: 222
HEMOGLOBIN A1C: 7.6 % (ref 4.3–5.6)
TEST STRIP LOT #: NORMAL NUMERIC

## 2025-08-25 PROCEDURE — 83036 HEMOGLOBIN GLYCOSYLATED A1C: CPT | Performed by: NURSE PRACTITIONER

## 2025-08-25 PROCEDURE — 3051F HG A1C>EQUAL 7.0%<8.0%: CPT | Performed by: NURSE PRACTITIONER

## 2025-08-25 PROCEDURE — 82947 ASSAY GLUCOSE BLOOD QUANT: CPT | Performed by: NURSE PRACTITIONER

## 2025-08-25 PROCEDURE — 3078F DIAST BP <80 MM HG: CPT | Performed by: NURSE PRACTITIONER

## 2025-08-25 PROCEDURE — 99214 OFFICE O/P EST MOD 30 MIN: CPT | Performed by: NURSE PRACTITIONER

## 2025-08-25 PROCEDURE — 3008F BODY MASS INDEX DOCD: CPT | Performed by: NURSE PRACTITIONER

## 2025-08-25 PROCEDURE — 3074F SYST BP LT 130 MM HG: CPT | Performed by: NURSE PRACTITIONER

## 2025-08-25 RX ORDER — BLOOD SUGAR DIAGNOSTIC
STRIP MISCELLANEOUS
Qty: 100 STRIP | Refills: 1 | Status: SHIPPED | OUTPATIENT
Start: 2025-08-25 | End: 2025-08-25

## 2025-08-25 RX ORDER — TIRZEPATIDE 2.5 MG/.5ML
2.5 INJECTION, SOLUTION SUBCUTANEOUS WEEKLY
Qty: 2 ML | Refills: 0 | Status: SHIPPED | OUTPATIENT
Start: 2025-08-25

## 2025-08-25 RX ORDER — GLIPIZIDE 5 MG/1
TABLET ORAL
Qty: 270 TABLET | Refills: 0 | Status: SHIPPED | OUTPATIENT
Start: 2025-08-25 | End: 2025-11-23

## 2025-08-25 RX ORDER — BLOOD SUGAR DIAGNOSTIC
STRIP MISCELLANEOUS
Qty: 100 STRIP | Refills: 1 | Status: SHIPPED | OUTPATIENT
Start: 2025-08-25

## 2025-08-25 RX ORDER — OMEPRAZOLE 20 MG/1
20 CAPSULE, DELAYED RELEASE ORAL EVERY MORNING
Qty: 90 CAPSULE | Refills: 0 | Status: SHIPPED | OUTPATIENT
Start: 2025-08-25 | End: 2025-11-23

## 2025-08-25 RX ORDER — TIRZEPATIDE 5 MG/.5ML
5 INJECTION, SOLUTION SUBCUTANEOUS WEEKLY
Qty: 6 ML | Refills: 0 | Status: SHIPPED | OUTPATIENT
Start: 2025-09-08

## (undated) DIAGNOSIS — E03.9 HYPOTHYROIDISM, UNSPECIFIED TYPE: Primary | ICD-10-CM

## (undated) DEVICE — Device: Brand: DEFENDO AIR/WATER/SUCTION AND BIOPSY VALVE

## (undated) DEVICE — CONMED SCOPE SAVER BITE BLOCK, 20X27 MM: Brand: SCOPE SAVER

## (undated) DEVICE — NEEDLE CONTRAST INTERJECT 25G

## (undated) DEVICE — MEDI-VAC NON-CONDUCTIVE SUCTION TUBING 6MM X 1.8M (6FT.) L: Brand: CARDINAL HEALTH

## (undated) DEVICE — STERILE LATEX POWDER-FREE SURGICAL GLOVESWITH NITRILE COATING: Brand: PROTEXIS

## (undated) DEVICE — 35 ML SYRINGE REGULAR TIP: Brand: MONOJECT

## (undated) DEVICE — FORCEP RADIAL JAW 4

## (undated) DEVICE — Device: Brand: CUSTOM PROCEDURE KIT

## (undated) DEVICE — LINE MNTR ADLT SET O2 INTMD

## (undated) NOTE — LETTER
9/4/2019              Milburn Bloch        [address] [place] [place]         Dear Baldomero Caba,    This letter is to inform you that our office has made several attempts to reach you by phone without success.   We were attempting to con

## (undated) NOTE — LETTER
4/4/2025          Deepa Albert        [address]        [facility] [address]         Dear Deepa,    This letter is to inform you that our office has made several attempts to reach you by phone without success.  We were attempting to contact you by phone regarding a condition update on your blood glucose levels.     Please contact our office at the number listed below as soon as you receive this letter to discuss this issue and to make the necessary changes in our system to your contact information.  Thank you for your cooperation.        Sincerely,    Endocrinology Clinical Staff  [facility] [place]  [address], [address]  [facility] [id number] [phone]        Document electronically generated by:  Marsha DURBIN RN

## (undated) NOTE — LETTER
May 29, 2024      No Recipients     Patient: Deepa Albert   YOB: 1965   Date of Visit: 2024       Dear Dr. Bajwa Recipients:    Thank you for referring Deepa Albert to me for evaluation. Here is my assessment and plan of care:    Deepa Ablert is a 59 year old female.    HPI:     HPI    Patient is here for a diabetic exam.  Patient states her vision is good.    Pt has been a diabetic for 12 years       Pt's diabetes is currently controlled by pills and injectable  Pt checks BS 2 times a day   Pt's last blood sugar was  140 this morning  Last HA1C was 7.5 on 24  Endocrinologist: Brielle ZAVALA    Last edited by Ute Chew OT on 2024 10:37 AM.        Patient History:  Past Medical History:    High cholesterol    Hyperlipidemia    Type II or unspecified type diabetes mellitus without mention of complication, not stated as uncontrolled       Surgical History: Deepa Albert has a past surgical history that includes  (,,).    Family History   Problem Relation Age of Onset    Diabetes Mother     Diabetes Father     Diabetes Sister     Diabetes Brother     Diabetes Brother     Colon Cancer Other         per NG:  Close relative     Glaucoma Neg     Macular degeneration Neg        Social History:   Social History     Socioeconomic History    Marital status: Single   Tobacco Use    Smoking status: Every Day     Types: Cigarettes    Smokeless tobacco: Never    Tobacco comments:     3 cigs a day    Vaping Use    Vaping status: Never Used   Substance and Sexual Activity    Alcohol use: Yes     Comment: per NG:  Occasionally     Drug use: No   Other Topics Concern    Caffeine Concern Yes     Comment: 2cups coffee, [id number]       Medications:  Current Outpatient Medications   Medication Sig Dispense Refill    semaglutide (OZEMPIC, 0.25 OR 0.5 MG/DOSE,) 2 MG/3ML Subcutaneous Solution Pen-injector Inject 0.5 mg into the skin once a week. 9 mL 0    metFORMIN 500 MG Oral Tab TAKE 2  TABLETS BY MOUTH TWICE DAILY WITH THE MORNING AND EVENING MEAL 360 tablet 0    levothyroxine 100 MCG Oral Tab Take 1 tablet (100 mcg total) by mouth before breakfast. 90 tablet 1    glimepiride 4 MG Oral Tab Take 1 tablet (4 mg total) by mouth before breakfast. 90 tablet 1    dapagliflozin (FARXIGA) 10 MG Oral Tab Take 1 tablet (10 mg total) by mouth daily. 90 tablet 1    atorvastatin 40 MG Oral Tab Take 1 tablet (40 mg total) by mouth nightly. 90 tablet 1    Glucose Blood (ONETOUCH VERIO) In Vitro Strip Test 1x daily 100 strip 1    Blood Glucose Monitoring Suppl (ONETOUCH VERIO) w/Device Does not apply Kit 1 each daily. Test 1x daily 1 kit 0    OneTouch Delica Lancets 33G Does not apply Misc 1 each daily. Test 1x daily 100 each 0    Microlet Lancets Does not apply Misc USE TO TEST BLOOD SUGAR TWICE DAILY 100 each 5    aspirin 81 MG Oral Tab EC Take 1 tablet (81 mg total) by mouth daily.         Allergies:  No Known Allergies    ROS:       PHYSICAL EXAM:     Base Eye Exam       Visual Acuity (Snellen - Linear)         Right Left    Dist sc 20/25 -2 20/20    Near cc 20/30 20/30      Correction: Glasses              Tonometry (Icare, 10:50 AM)         Right Left    Pressure 18 18              Pupils         Pupils    Right PERRL    Left PERRL              Visual Fields         Left Right     Full Full              Extraocular Movement         Right Left     Full, Ortho Full, Ortho              Neuro/Psych       Oriented x3: Yes    Mood/Affect: Normal              Dilation       Both eyes: 1.0% Mydriacyl and 2.5% Louis Synephrine @ 10:50 AM                  Slit Lamp and Fundus Exam       External Exam         Right Left    External Normal Normal              Slit Lamp Exam         Right Left    Lids/Lashes Dermatochalasis, Meibomian gland dysfunction Dermatochalasis, Meibomian gland dysfunction    Conjunctiva/Sclera Nasal/temp pinguecula Nasal/temp pinguecula    Cornea Clear Clear    Anterior Chamber Deep and quiet Deep  and quiet    Iris Normal Normal    Lens 1+ Nuclear sclerosis 1+ Nuclear sclerosis    Vitreous Clear Clear              Fundus Exam         Right Left    Disc Good rim, Temporal crescent Good rim, Temporal crescent    C/D Ratio 0.2 0.2    Macula Normal No BDR Normal No BDR    Vessels Normal Normal    Periphery Normal Normal                  Refraction       Wearing Rx         Sphere Cylinder    Right +2.25 Sphere    Left +2.25 Sphere      Type: Reading only              Manifest Refraction         Sphere Cylinder    Right +2.25 Sphere    Left +2.25 Sphere   Patient is happy with reading glasses only and would just like a little stronger power for her next pair.             Final Rx         Sphere Cylinder Near VA    Right +2.50 Sphere 20/20    Left +2.50 Sphere 20/20      Type: Reading only                     ASSESSMENT/PLAN:     Diagnoses and Plan:     Diabetes mellitus type 2 without retinopathy (HCC)  Diabetes type II: no background of retinopathy, no signs of neovascularization noted.  Discussed ocular and systemic benefits of blood sugar control.  Diagnosis and treatment discussed in detail with patient.    Will see patient in 1 year for a diabetic exam    Age-related nuclear cataract of both eyes  Discussed mild cataracts in both eyes that are not affecting vision and are not surgical at this time.          No orders of the defined types were placed in this encounter.      Meds This Visit:  Requested Prescriptions      No prescriptions requested or ordered in this encounter        Follow up instructions:  Return in about 1 year (around 5/29/2025) for Diabetic eye exam.    5/29/2024  Scribed by: Niko Garnica MD        If you have questions, please do not hesitate to call me. I look forward to following Deepa along with you.    Sincerely,        Niko Garnica MD        CC:   No Recipients    Document electronically generated by: Niko Garnica MD

## (undated) NOTE — LETTER
11/29/2022          To Whom It May Concern:    Lorna Woods is currently under my medical care and may not return to work at this time. Please excuse Pelon Torres for 3 days. She may return to work on 12/02/2022. Activity is restricted as follows: none. If you require additional information please contact our office. Sincerely,      Cindy Dorsey. Omaira Dodge DO          Document generated by:  Cindy Dorsey.  DO Analisa

## (undated) NOTE — LETTER
8/25/2019              Carlota Palencia         Dear Ms. Jan Huerta,    As we discussed, your stomach endoscopy exam (\"EGD\") at the [facility] on 8/6/2019 showed healthy and normal throat and

## (undated) NOTE — LETTER
12/7/2022              Lg Moffett        [address] [facility]         Dear Ladan Jacobs,    This letter is to inform you that our office has made several attempts to reach you by phone without success. We were attempting to contact you by phone regarding prescription request    Please contact our office at the number listed below as soon as you receive this letter to discuss this issue and to make the necessary changes in our system to your contact information. Thank you for your cooperation. Sincerely,    Jordan Phan.  DO Mariaelena Richard  [address]  [address]  [address]  [phone]

## (undated) NOTE — Clinical Note
SPEEDY, Patient eligible for TCM until 4/16. She scheduled her HFU for 4/18/2019 and did not want a sooner appt. TCM template completed.

## (undated) NOTE — LETTER
10/12/2021              Jeanette Min        [address] [place]         Dear Sudeep Dowell,    This letter is to inform you that our office has made several attempts to reach you by phone without success.   We were attempting to c

## (undated) NOTE — MR AVS SNAPSHOT
[address]  [address] [facility]  [phone]               Thank you for choosing us for your health care visit with Hilda Polk MD.  We are glad to serve you and happy to provide you with this summary o Return in about 6 months (around 10/12/2017).          Reason for Today's Visit     Diabetes           Medical Issues Discussed Today     Uncontrolled type 2 diabetes mellitus with hyperglycemia, with long-term current use of insulin ([facility] [address]    -  [employer] will allow you to access patient instructions from your recent visit,  view other health information, and more. To sign up or find more information, go to [email or web address] and click on the Sign Up Now link in the [employer] box.      Enter 2 ½ hours per week – spread out over time Use a jona to keep you motivated   Don’t forget strength training with weights and resistance Set goals and track your progress   You don’t need to join a gym. Home exercises work great.  Put more priority on exe

## (undated) NOTE — LETTER
Patricia Mckinley  [address], [address]       02/05/19        Patient: Belinda Cueva   YOB: 1965   Date of Visit: 2/5/2019       Dear  Dr. Magalis Eisenberg,      Thank you for referring Belinda Cueva to my practi

## (undated) NOTE — LETTER
AUTHORIZATION FOR SURGICAL OPERATION OR OTHER PROCEDURE    1. I hereby authorize Dr. Ken Farnsworth , and [facility] staff assigned to my case to perform the following operation and/or procedure at the [facility]:    _______________________________________________________________________________________________    Cortisone injection Right foot   _______________________________________________________________________________________________    2. My physician has explained the nature and purpose of the operation or other procedure, possible alternative methods of treatment, the risks involved, and the possibility of complication to me. I acknowledge that no guarantee has been made as to the result that may be obtained. 3.  I recognize that, during the course of this operation, or other procedure, unforseen conditions may necessitate additional or different procedure than those listed above. I, therefore, further authorize and request that the above named physician, his/her physician assistants or designees perform such procedures as are, in his/her professional opinion, necessary and desirable. 4.  Any tissue or organs removed in the operation or other procedure may be disposed of by and at the discretion of the [facility] and [facility] AT [facility]. 5.  I understand that in the event of a medical emergency, I will be transported by local paramedics to [facility] or other hospital emergency department. 6.  I certify that I have read and fully understand the above consent to operation and/or other procedure. 7.  I acknowledge that my physician has explained sedation/analgesia administration to me including the risks and benefits. I consent to the administration of sedation/analgesia as may be necessary or desirable in the judgement of my physician.     Witness signature: ___________________________________________________ Date:  ______/______/_____ Time:  ________ A. M.  P.M. Patient Name:  ______________________________________________________  (please print)      Patient signature:  ___________________________________________________             Relationship to Patient:           []  Parent    Responsible person                          []  Spouse  In case of minor or                    [] Other  _____________   Incompetent name:  __________________________________________________                               (please print)      _____________      Responsible person  In case of minor or  Incompetent signature:  _______________________________________________    Statement of Physician  My signature below affirms that prior to the time of the procedure, I have explained to the patient and/or his/her guardian, the risks and benefits involved in the proposed treatment and any reasonable alternative to the proposed treatment. I have also explained the risks and benefits involved in the refusal of the proposed treatment and have answered the patient's questions.                         Date:  ______/______/_______  Provider                      Signature:  __________________________________________________________       Time:  ___________ A.M    P.M.